# Patient Record
Sex: FEMALE | Race: WHITE | Employment: PART TIME | URBAN - NONMETROPOLITAN AREA
[De-identification: names, ages, dates, MRNs, and addresses within clinical notes are randomized per-mention and may not be internally consistent; named-entity substitution may affect disease eponyms.]

---
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
## 2020-07-17 ENCOUNTER — OFFICE VISIT (OUTPATIENT)
Dept: INTERNAL MEDICINE | Facility: CLINIC | Age: 20
End: 2020-07-17

## 2020-07-17 VITALS
BODY MASS INDEX: 35.55 KG/M2 | WEIGHT: 213.38 LBS | HEIGHT: 65 IN | TEMPERATURE: 98.3 F | OXYGEN SATURATION: 98 % | DIASTOLIC BLOOD PRESSURE: 76 MMHG | SYSTOLIC BLOOD PRESSURE: 115 MMHG | HEART RATE: 85 BPM

## 2020-07-17 DIAGNOSIS — Z30.019 ENCOUNTER FOR FEMALE BIRTH CONTROL: Primary | ICD-10-CM

## 2020-07-17 PROCEDURE — 99202 OFFICE O/P NEW SF 15 MIN: CPT | Performed by: NURSE PRACTITIONER

## 2020-07-17 RX ORDER — NORGESTIMATE AND ETHINYL ESTRADIOL 0.25-0.035
KIT ORAL
COMMUNITY
End: 2020-07-17 | Stop reason: SDUPTHER

## 2020-07-17 RX ORDER — NORGESTIMATE AND ETHINYL ESTRADIOL 0.25-0.035
1 KIT ORAL DAILY
Qty: 28 TABLET | Refills: 6 | Status: SHIPPED | OUTPATIENT
Start: 2020-07-17 | End: 2020-12-17

## 2020-07-17 NOTE — PROGRESS NOTES
Subjective     Chief Complaint   Patient presents with   • Establish Care   • Gynecologic Exam     Pap       History of Present Illness  Pt comes in today to establish care. She is doing well overall. She works as a  worker. Is without complaints today. She started her period yesterday and needs pap exam. Will do at later date.     Review of Systems   Constitutional: Negative for activity change and fever.   HENT: Negative for mouth sores, sinus pain and sneezing.    Eyes: Negative for photophobia.   Respiratory: Negative for cough and shortness of breath.    Cardiovascular: Negative for chest pain and palpitations.   Gastrointestinal: Negative for blood in stool, constipation and nausea.   Endocrine: Negative for polydipsia, polyphagia and polyuria.   Genitourinary: Negative for dyspareunia, hematuria and vaginal discharge.   Musculoskeletal: Positive for back pain ( from lifting kids).   Allergic/Immunologic: Negative for environmental allergies.   Neurological: Negative for seizures, syncope and weakness.   Hematological: Negative for adenopathy. Does not bruise/bleed easily.   Psychiatric/Behavioral: Negative for self-injury, sleep disturbance and suicidal ideas. The patient is not nervous/anxious.       Otherwise complete ROS reviewed and negative except as mentioned in the HPI.    Past Medical History:   Past Medical History:   Diagnosis Date   • Anxiety    • Depression      Past Surgical History:  Past Surgical History:   Procedure Laterality Date   • INNER EAR SURGERY     • TONSILLECTOMY       Social History:  reports that she has been smoking. She has never used smokeless tobacco. She reports that she does not drink alcohol or use drugs.    Family History: family history includes Arthritis in her mother; Cancer in her mother.      Allergies:  No Known Allergies  Medications:  Prior to Admission medications    Medication Sig Start Date End Date Taking? Authorizing Provider  "  norgestimate-ethinyl estradiol (ORTHO-CYCLEN) 0.25-35 MG-MCG per tablet Take  by mouth.    Provider, MD Refugio       Objective     Vital Signs: /76 (BP Location: Right arm, Patient Position: Sitting, Cuff Size: Adult)   Pulse 85   Temp 98.3 °F (36.8 °C) (Skin)   Ht 165.1 cm (65\")   Wt 96.8 kg (213 lb 6 oz)   SpO2 98%   BMI 35.51 kg/m²   Physical Exam   Constitutional: She is oriented to person, place, and time. She appears well-developed and well-nourished.   HENT:   Head: Normocephalic and atraumatic.   Mouth/Throat: Oropharynx is clear and moist.   Eyes: Pupils are equal, round, and reactive to light. Conjunctivae and EOM are normal. No scleral icterus.   Neck: Neck supple. No JVD present.   Cardiovascular: Normal rate, regular rhythm, normal heart sounds and intact distal pulses. Exam reveals no gallop and no friction rub.   No murmur heard.  Pulmonary/Chest: Effort normal and breath sounds normal. She has no wheezes. She has no rales.   Abdominal: Soft. Bowel sounds are normal. She exhibits no distension and no mass. There is no tenderness. There is no rebound and no guarding.   Musculoskeletal: Normal range of motion. She exhibits no edema.   No cyanosis or clubbing   Lymphadenopathy:     She has no cervical adenopathy.   Neurological: She is alert and oriented to person, place, and time. No cranial nerve deficit.   Skin: Skin is warm and dry.   Psychiatric: She has a normal mood and affect. Her behavior is normal.       Patient's Body mass index is 35.51 kg/m². BMI is above normal parameters. Recommendations include: educational material.      Results Reviewed:  Glucose   Date Value Ref Range Status   06/10/2018 98 74 - 109 mg/dL Final     BUN   Date Value Ref Range Status   06/10/2018 7 6 - 20 mg/dL Final     Creatinine   Date Value Ref Range Status   06/10/2018 0.7 0.5 - 0.9 mg/dL Final     Sodium   Date Value Ref Range Status   06/10/2018 139 136 - 145 mmol/L Final     Potassium   Date " Value Ref Range Status   06/10/2018 3.7 3.5 - 5.0 mmol/L Final     Chloride   Date Value Ref Range Status   06/10/2018 101 98 - 111 mmol/L Final     CO2   Date Value Ref Range Status   06/10/2018 24 22 - 29 mmol/L Final     Calcium   Date Value Ref Range Status   06/10/2018 9.5 8.6 - 10.0 mg/dL Final     ALT (SGPT)   Date Value Ref Range Status   06/10/2018 16 5 - 33 U/L Final     AST (SGOT)   Date Value Ref Range Status   06/10/2018 24 5 - 32 U/L Final     WBC   Date Value Ref Range Status   06/10/2018 20.0 (H) 4.8 - 10.8 K/uL Final     Hematocrit   Date Value Ref Range Status   06/10/2018 39.0 37.0 - 47.0 % Final     Platelets   Date Value Ref Range Status   06/10/2018 465 (H) 130 - 400 K/uL Final         Assessment / Plan     Assessment/Plan:  Goldie was seen today for establish care and gynecologic exam.    Diagnoses and all orders for this visit:    Encounter for female birth control    Other orders  -     norgestimate-ethinyl estradiol (ORTHO-CYCLEN) 0.25-35 MG-MCG per tablet; Take 1 tablet by mouth Daily.      No follow-ups on file. unless patient needs to be seen sooner or acute issues arise.    Code Status: Full.     I have discussed the patient results/orders and and plan/recommendation with them at today's visit.      Antonette Florentino, APRN   07/17/2020

## 2020-07-29 ENCOUNTER — OFFICE VISIT (OUTPATIENT)
Dept: INTERNAL MEDICINE | Facility: CLINIC | Age: 20
End: 2020-07-29

## 2020-07-29 VITALS
RESPIRATION RATE: 18 BRPM | SYSTOLIC BLOOD PRESSURE: 106 MMHG | WEIGHT: 209.2 LBS | TEMPERATURE: 98.2 F | DIASTOLIC BLOOD PRESSURE: 68 MMHG | HEART RATE: 89 BPM | OXYGEN SATURATION: 98 % | BODY MASS INDEX: 34.85 KG/M2 | HEIGHT: 65 IN

## 2020-07-29 DIAGNOSIS — Z30.011 ENCOUNTER FOR INITIAL PRESCRIPTION OF CONTRACEPTIVE PILLS: ICD-10-CM

## 2020-07-29 DIAGNOSIS — N89.8 VAGINAL DISCHARGE: ICD-10-CM

## 2020-07-29 DIAGNOSIS — Z00.00 ANNUAL PHYSICAL EXAM: Primary | ICD-10-CM

## 2020-07-29 DIAGNOSIS — Z12.4 SCREENING FOR CERVICAL CANCER: ICD-10-CM

## 2020-07-29 LAB
B-HCG UR QL: NEGATIVE
INTERNAL NEGATIVE CONTROL: NEGATIVE
INTERNAL POSITIVE CONTROL: POSITIVE
Lab: NORMAL

## 2020-07-29 PROCEDURE — 81025 URINE PREGNANCY TEST: CPT | Performed by: NURSE PRACTITIONER

## 2020-07-29 PROCEDURE — 99395 PREV VISIT EST AGE 18-39: CPT | Performed by: NURSE PRACTITIONER

## 2020-07-29 NOTE — PROGRESS NOTES
Subjective     Chief Complaint   Patient presents with   • Annual Exam     Pap smear       History of Present Illness  Pt comes in for her physical with pap exam. She is on BCP and no other meds. States she carries a history of anxiety and depression but decline medication at this time. She is sexually active. .      Review of Systems   Constitutional: Negative for fatigue and fever.   HENT: Negative for sinus pressure, sneezing and sore throat.    Eyes: Negative for itching and visual disturbance.   Respiratory: Negative for cough, shortness of breath and wheezing.    Cardiovascular: Negative for chest pain and palpitations.   Gastrointestinal: Negative for constipation and diarrhea.   Endocrine: Negative for polydipsia, polyphagia and polyuria.   Genitourinary: Negative for dyspareunia, dysuria, hematuria and menstrual problem.   Musculoskeletal: Positive for back pain.   Skin: Negative for pallor and rash.   Allergic/Immunologic: Negative for environmental allergies and immunocompromised state.   Neurological: Negative for seizures, syncope and headaches.   Hematological: Negative for adenopathy. Does not bruise/bleed easily.   Psychiatric/Behavioral: Negative for suicidal ideas. The patient is nervous/anxious. The patient is not hyperactive.         Depression as well.       Otherwise complete ROS reviewed and negative except as mentioned in the HPI.    Past Medical History:   Past Medical History:   Diagnosis Date   • Anxiety    • Depression      Past Surgical History:  Past Surgical History:   Procedure Laterality Date   • INNER EAR SURGERY     • TONSILLECTOMY       Social History:  reports that she has been smoking cigarettes. She has a 0.50 pack-year smoking history. She has never used smokeless tobacco. She reports that she does not drink alcohol or use drugs.    Family History: family history includes Arthritis in her mother; Cancer in her mother.      Allergies:  No Known  "Allergies  Medications:  Prior to Admission medications    Medication Sig Start Date End Date Taking? Authorizing Provider   norgestimate-ethinyl estradiol (ORTHO-CYCLEN) 0.25-35 MG-MCG per tablet Take 1 tablet by mouth Daily. 7/17/20  Yes Antonette Florentino APRN       Objective     Vital Signs: /68 (BP Location: Left arm, Patient Position: Sitting, Cuff Size: Large Adult)   Pulse 89   Temp 98.2 °F (36.8 °C) (Skin)   Resp 18   Ht 165.1 cm (65\")   Wt 94.9 kg (209 lb 3.2 oz)   SpO2 98%   BMI 34.81 kg/m²   Physical Exam   Constitutional: She is oriented to person, place, and time. She appears well-developed and well-nourished.   HENT:   Head: Normocephalic and atraumatic.   Mouth/Throat: Oropharynx is clear and moist.   Eyes: Pupils are equal, round, and reactive to light. Conjunctivae and EOM are normal. No scleral icterus.   Neck: Neck supple. No JVD present.   Cardiovascular: Normal rate, regular rhythm, normal heart sounds and intact distal pulses. Exam reveals no gallop and no friction rub.   No murmur heard.  Pulmonary/Chest: Effort normal and breath sounds normal. She has no wheezes. She has no rales.   Cystic type changes bilateral breasts.    Abdominal: Soft. Bowel sounds are normal. She exhibits no distension and no mass. There is no tenderness. There is no rebound and no guarding.   Genitourinary: Uterus normal. Pelvic exam was performed with patient supine. Cervix exhibits discharge and friability. Right adnexum displays no mass. Left adnexum displays no mass. Vaginal discharge found.   Musculoskeletal: Normal range of motion. She exhibits no edema.   No cyanosis or clubbing   Lymphadenopathy:     She has no cervical adenopathy.   Neurological: She is alert and oriented to person, place, and time. No cranial nerve deficit.   Skin: Skin is warm and dry.   Psychiatric: She has a normal mood and affect. Her behavior is normal.       Patient's Body mass index is 34.81 kg/m². BMI is above " normal parameters. Recommendations include: educational material.      Results Reviewed:  Glucose   Date Value Ref Range Status   06/10/2018 98 74 - 109 mg/dL Final     BUN   Date Value Ref Range Status   06/10/2018 7 6 - 20 mg/dL Final     Creatinine   Date Value Ref Range Status   06/10/2018 0.7 0.5 - 0.9 mg/dL Final     Sodium   Date Value Ref Range Status   06/10/2018 139 136 - 145 mmol/L Final     Potassium   Date Value Ref Range Status   06/10/2018 3.7 3.5 - 5.0 mmol/L Final     Chloride   Date Value Ref Range Status   06/10/2018 101 98 - 111 mmol/L Final     CO2   Date Value Ref Range Status   06/10/2018 24 22 - 29 mmol/L Final     Calcium   Date Value Ref Range Status   06/10/2018 9.5 8.6 - 10.0 mg/dL Final     ALT (SGPT)   Date Value Ref Range Status   06/10/2018 16 5 - 33 U/L Final     AST (SGOT)   Date Value Ref Range Status   06/10/2018 24 5 - 32 U/L Final     WBC   Date Value Ref Range Status   06/10/2018 20.0 (H) 4.8 - 10.8 K/uL Final     Hematocrit   Date Value Ref Range Status   06/10/2018 39.0 37.0 - 47.0 % Final     Platelets   Date Value Ref Range Status   06/10/2018 465 (H) 130 - 400 K/uL Final         Assessment / Plan     Assessment/Plan:  Goldie was seen today for annual exam.    Diagnoses and all orders for this visit:    Annual physical exam  -     CBC & Differential  -     Comprehensive metabolic panel  -     T4  -     TSH  -     Lipid panel  -     Cancel: Liquid-based Pap Smear, Screening; Future  -     Liquid-based Pap Smear, Screening  -     POCT pregnancy, urine    Vaginal discharge  -     Cancel: NuSwab VG+ - Swab, Vagina; Future  -     NuSwab VG+ - Swab, Vagina    Screening for cervical cancer  -     Cancel: Liquid-based Pap Smear, Screening; Future  -     Liquid-based Pap Smear, Screening    Encounter for initial prescription of contraceptive pills      Pregnancy test was negative.     No follow-ups on file. unless patient needs to be seen sooner or acute issues arise.    Code  Status: Full.     I have discussed the patient results/orders and and plan/recommendation with them at today's visit.      Antonette Florentino, APRN   07/29/2020

## 2020-07-30 LAB
ALBUMIN SERPL-MCNC: 4.1 G/DL (ref 3.9–5)
ALBUMIN/GLOB SERPL: 1.3 {RATIO} (ref 1.2–2.2)
ALP SERPL-CCNC: 77 IU/L (ref 39–117)
ALT SERPL-CCNC: 14 IU/L (ref 0–32)
AST SERPL-CCNC: 14 IU/L (ref 0–40)
BASOPHILS # BLD AUTO: 0 X10E3/UL (ref 0–0.2)
BASOPHILS NFR BLD AUTO: 0 %
BILIRUB SERPL-MCNC: 0.2 MG/DL (ref 0–1.2)
BUN SERPL-MCNC: 9 MG/DL (ref 6–20)
BUN/CREAT SERPL: 13 (ref 9–23)
CALCIUM SERPL-MCNC: 9 MG/DL (ref 8.7–10.2)
CHLORIDE SERPL-SCNC: 109 MMOL/L (ref 96–106)
CHOLEST SERPL-MCNC: 204 MG/DL (ref 100–199)
CO2 SERPL-SCNC: 21 MMOL/L (ref 20–29)
CREAT SERPL-MCNC: 0.72 MG/DL (ref 0.57–1)
EOSINOPHIL # BLD AUTO: 0.3 X10E3/UL (ref 0–0.4)
EOSINOPHIL NFR BLD AUTO: 3 %
ERYTHROCYTE [DISTWIDTH] IN BLOOD BY AUTOMATED COUNT: 14.6 % (ref 11.7–15.4)
GLOBULIN SER CALC-MCNC: 3.1 G/DL (ref 1.5–4.5)
GLUCOSE SERPL-MCNC: 82 MG/DL (ref 65–99)
HCT VFR BLD AUTO: 35.9 % (ref 34–46.6)
HDLC SERPL-MCNC: 48 MG/DL
HGB BLD-MCNC: 11.4 G/DL (ref 11.1–15.9)
IMM GRANULOCYTES # BLD AUTO: 0.1 X10E3/UL (ref 0–0.1)
IMM GRANULOCYTES NFR BLD AUTO: 1 %
LDLC SERPL CALC-MCNC: 129 MG/DL (ref 0–99)
LYMPHOCYTES # BLD AUTO: 4.4 X10E3/UL (ref 0.7–3.1)
LYMPHOCYTES NFR BLD AUTO: 39 %
MCH RBC QN AUTO: 26.2 PG (ref 26.6–33)
MCHC RBC AUTO-ENTMCNC: 31.8 G/DL (ref 31.5–35.7)
MCV RBC AUTO: 83 FL (ref 79–97)
MONOCYTES # BLD AUTO: 0.5 X10E3/UL (ref 0.1–0.9)
MONOCYTES NFR BLD AUTO: 4 %
NEUTROPHILS # BLD AUTO: 6 X10E3/UL (ref 1.4–7)
NEUTROPHILS NFR BLD AUTO: 53 %
PLATELET # BLD AUTO: 398 X10E3/UL (ref 150–450)
POTASSIUM SERPL-SCNC: 4.2 MMOL/L (ref 3.5–5.2)
PROT SERPL-MCNC: 7.2 G/DL (ref 6–8.5)
RBC # BLD AUTO: 4.35 X10E6/UL (ref 3.77–5.28)
SODIUM SERPL-SCNC: 141 MMOL/L (ref 134–144)
T4 SERPL-MCNC: 9.6 UG/DL (ref 4.5–12)
TRIGL SERPL-MCNC: 135 MG/DL (ref 0–149)
TSH SERPL DL<=0.005 MIU/L-ACNC: 1.23 UIU/ML (ref 0.45–4.5)
VLDLC SERPL CALC-MCNC: 27 MG/DL (ref 5–40)
WBC # BLD AUTO: 11.3 X10E3/UL (ref 3.4–10.8)

## 2020-08-04 LAB
CYTOLOGIST CVX/VAG CYTO: ABNORMAL
CYTOLOGY CVX/VAG DOC CYTO: ABNORMAL
DX ICD CODE: ABNORMAL
DX ICD CODE: ABNORMAL
HIV 1 & 2 AB SER-IMP: ABNORMAL
OTHER STN SPEC: ABNORMAL
PATHOLOGIST CVX/VAG CYTO: ABNORMAL
RECOM F/U CVX/VAG CYTO: ABNORMAL
STAT OF ADQ CVX/VAG CYTO-IMP: ABNORMAL

## 2020-08-06 ENCOUNTER — TELEPHONE (OUTPATIENT)
Dept: INTERNAL MEDICINE | Facility: CLINIC | Age: 20
End: 2020-08-06

## 2020-08-06 DIAGNOSIS — R87.612 LOW GRADE SQUAMOUS INTRAEPITHELIAL LESION ON CYTOLOGIC SMEAR OF CERVIX (LGSIL): Primary | ICD-10-CM

## 2020-08-06 RX ORDER — FLUCONAZOLE 200 MG/1
200 TABLET ORAL DAILY
Qty: 5 TABLET | Refills: 0 | Status: SHIPPED | OUTPATIENT
Start: 2020-08-06 | End: 2021-08-23

## 2020-08-06 NOTE — TELEPHONE ENCOUNTER
Patient called yesterday and a RX was supposed to be called in for a yeast infection.    Please Advise.

## 2020-08-11 LAB
A VAGINAE DNA VAG QL NAA+PROBE: NORMAL SCORE
BVAB2 DNA VAG QL NAA+PROBE: NORMAL SCORE
C ALBICANS DNA VAG QL NAA+PROBE: NEGATIVE
C GLABRATA DNA VAG QL NAA+PROBE: NEGATIVE
C TRACH DNA VAG QL NAA+PROBE: NORMAL
MEGA1 DNA VAG QL NAA+PROBE: NORMAL SCORE
N GONORRHOEA DNA VAG QL NAA+PROBE: NORMAL
T VAGINALIS DNA VAG QL NAA+PROBE: NEGATIVE

## 2020-09-01 ENCOUNTER — OFFICE VISIT (OUTPATIENT)
Dept: OBSTETRICS AND GYNECOLOGY | Facility: CLINIC | Age: 20
End: 2020-09-01

## 2020-09-01 VITALS — HEIGHT: 65 IN | WEIGHT: 211 LBS | BODY MASS INDEX: 35.16 KG/M2

## 2020-09-01 DIAGNOSIS — R87.612 LGSIL ON PAP SMEAR OF CERVIX: Primary | ICD-10-CM

## 2020-09-01 PROCEDURE — 99203 OFFICE O/P NEW LOW 30 MIN: CPT | Performed by: OBSTETRICS & GYNECOLOGY

## 2020-09-01 NOTE — PROGRESS NOTES
Subjective   Goldie Thomas is a 19 y.o. female  YOB: 2000        Chief Complaint   Patient presents with   • Abnormal Pap Smear     PT is here for an abnormal pap PT states she was treated for a yeast infection  PT states she is not having any itching or any symptoms at this time.         19-year-old female  0 para 0 last menstrual period 2020 presents to [facility].  Patient was previously referred due to low-grade Pap smear.  Patient has no complaints at this time.  She does report she is currently on Ortho-Cyclen for cycle regulation.  She reports her cycles are overall regular lasting approximately 4 to 5 days.  She reports that she is sexually active with one partner at this time using condoms.  She does also report that she currently smokes less than a pack per day.      No Known Allergies    Past Medical History:   Diagnosis Date   • Anxiety    • Depression        Family History   Problem Relation Age of Onset   • Arthritis Mother    • Cancer Mother        Social History     Socioeconomic History   • Marital status: Single     Spouse name: Not on file   • Number of children: Not on file   • Years of education: Not on file   • Highest education level: Not on file   Tobacco Use   • Smoking status: Current Every Day Smoker     Packs/day: 0.50     Years: 1.00     Pack years: 0.50     Types: Cigarettes   • Smokeless tobacco: Never Used   Substance and Sexual Activity   • Alcohol use: Never     Frequency: Never   • Drug use: Never   • Sexual activity: Yes     Partners: Male     Birth control/protection: OCP         Current Outpatient Medications:   •  norgestimate-ethinyl estradiol (ORTHO-CYCLEN) 0.25-35 MG-MCG per tablet, Take 1 tablet by mouth Daily., Disp: 28 tablet, Rfl: 6  •  fluconazole (Diflucan) 200 MG tablet, Take 1 tablet by mouth Daily., Disp: 5 tablet, Rfl: 0    Patient's last menstrual period was 08/15/2020 (approximate).    Sexual History:         Could not be  "calculated    Past Surgical History:   Procedure Laterality Date   • INNER EAR SURGERY     • TONSILLECTOMY         Review of Systems   Constitutional: Negative for activity change and unexpected weight loss.   HENT: Negative for congestion.    Cardiovascular: Negative for chest pain.   Gastrointestinal: Negative for blood in stool, constipation and diarrhea.   Endocrine: Negative for cold intolerance and heat intolerance.   Genitourinary: Negative for dyspareunia, pelvic pain and vaginal discharge.   Musculoskeletal: Negative for arthralgias, back pain, neck pain and neck stiffness.   Skin: Negative for rash.   Neurological: Negative for dizziness and headache.   Psychiatric/Behavioral: Negative for sleep disturbance. The patient is not nervous/anxious.        Objective   Physical Exam   Constitutional: She is oriented to person, place, and time. She appears well-developed and well-nourished. No distress.   HENT:   Head: Normocephalic and atraumatic.   Eyes: Conjunctivae are normal. Right eye exhibits no discharge. Left eye exhibits no discharge.   Neck: Normal range of motion. No thyromegaly present.   Cardiovascular: Normal rate and regular rhythm. Exam reveals no gallop and no friction rub.   No murmur heard.  Pulmonary/Chest: Effort normal and breath sounds normal. No stridor. She has no wheezes. She has no rales.   Abdominal: There is no tenderness.   Musculoskeletal: Normal range of motion.   Neurological: She is alert and oriented to person, place, and time.   Skin: Skin is warm and dry.   Psychiatric: She has a normal mood and affect. Her behavior is normal. Judgment normal.   Nursing note and vitals reviewed.        Vitals:    09/01/20 1310   Weight: 95.7 kg (211 lb)   Height: 165.1 cm (65\")       Goldie was seen today for abnormal pap smear.    Diagnoses and all orders for this visit:    LGSIL on Pap smear of cervix    -Discussed with patient ASC CP recommendations at this time.  Discussed with patient " that as per ASC CP Pap smears should begin at age 21.  -Discussed with patient Gardasil at this time.  Patient declined.  Offered to give patient information.  Patient declined.  -Discussed with patient importance of smoking cessation at this time.  -Questions answered patient verbalized understanding of plan.    Kathya Garcia, DO

## 2021-08-17 NOTE — TELEPHONE ENCOUNTER
Rx Refill Note  Requested Prescriptions     Pending Prescriptions Disp Refills   • Sprintec 28 0.25-35 MG-MCG per tablet [Pharmacy Med Name: SPRINTEC 28 DAY TABLET] 84 tablet 2     Sig: TAKE 1 TABLET BY MOUTH EVERY DAY     Med last filled: 12/17/20    Last office visit with prescribing clinician: 7/29/2020      Next office visit with prescribing clinician: Visit date not found- Want apt since its been over a year?              Kareen Hills RN  08/17/21, 07:35 CDT

## 2021-08-23 ENCOUNTER — OFFICE VISIT (OUTPATIENT)
Dept: INTERNAL MEDICINE | Facility: CLINIC | Age: 21
End: 2021-08-23

## 2021-08-23 VITALS
WEIGHT: 201.5 LBS | SYSTOLIC BLOOD PRESSURE: 109 MMHG | HEART RATE: 104 BPM | BODY MASS INDEX: 33.57 KG/M2 | TEMPERATURE: 98.4 F | OXYGEN SATURATION: 98 % | DIASTOLIC BLOOD PRESSURE: 65 MMHG | HEIGHT: 65 IN

## 2021-08-23 DIAGNOSIS — Z30.019 ENCOUNTER FOR FEMALE BIRTH CONTROL: Primary | ICD-10-CM

## 2021-08-23 DIAGNOSIS — Z00.00 ENCOUNTER FOR WELLNESS EXAMINATION IN ADULT: ICD-10-CM

## 2021-08-23 PROCEDURE — 99395 PREV VISIT EST AGE 18-39: CPT | Performed by: NURSE PRACTITIONER

## 2021-08-23 RX ORDER — NORGESTIMATE AND ETHINYL ESTRADIOL 0.25-0.035
1 KIT ORAL DAILY
Qty: 28 TABLET | Refills: 11 | Status: SHIPPED | OUTPATIENT
Start: 2021-08-23 | End: 2022-09-09

## 2021-08-23 NOTE — PROGRESS NOTES
Subjective     Chief Complaint   Patient presents with   • Annual Exam       History of Present Illness  Patient states she is here for her annual physical. The patient stats that she takes sprintec daily.She saw her OB/GYN last September.  She states she smokes 1/2 a pack of cigerettes daily.  The patient denies any shortness of breath of chest pain. She has no new complaints at this time.     Review of Systems   Constitutional: Negative for chills, fatigue and fever.   HENT: Negative for congestion and sinus pressure.    Respiratory: Negative for cough, chest tightness and shortness of breath.    Cardiovascular: Negative for chest pain, palpitations and leg swelling.   Gastrointestinal: Negative for constipation, diarrhea and nausea.   Endocrine: Negative for cold intolerance and heat intolerance.   Genitourinary: Negative for difficulty urinating, menstrual problem and pelvic pain.   Musculoskeletal: Negative for arthralgias, back pain and myalgias.   Neurological: Negative for dizziness, weakness, light-headedness and headaches.   Hematological: Does not bruise/bleed easily.       Past Medical History:   Past Medical History:   Diagnosis Date   • Anxiety    • Depression      Past Surgical History:  Past Surgical History:   Procedure Laterality Date   • INNER EAR SURGERY     • TONSILLECTOMY       Social History:  reports that she has been smoking cigarettes. She has a 0.50 pack-year smoking history. She has never used smokeless tobacco. She reports that she does not drink alcohol and does not use drugs.    Family History: family history includes Arthritis in her mother; Cancer in her mother.       Allergies:  No Known Allergies  Medications:  Prior to Admission medications    Medication Sig Start Date End Date Taking? Authorizing Provider   Sprintec 28 0.25-35 MG-MCG per tablet TAKE 1 TABLET BY MOUTH EVERY DAY 8/17/21  Yes Antonette Florentino APRN   fluconazole (Diflucan) 200 MG tablet Take 1 tablet  "by mouth Daily. 8/6/20   Mishel Antonette EsparzaSERA alclaa       Objective     Vital Signs: /65 (BP Location: Left arm, Patient Position: Sitting, Cuff Size: Adult)   Pulse 104   Temp 98.4 °F (36.9 °C) (Skin)   Ht 165.1 cm (65\")   Wt 91.4 kg (201 lb 8 oz)   SpO2 98%   BMI 33.53 kg/m²   Physical Exam  Vitals reviewed.   Constitutional:       Appearance: She is well-developed.   HENT:      Head: Normocephalic and atraumatic.   Eyes:      Pupils: Pupils are equal, round, and reactive to light.   Neck:      Vascular: No JVD.   Cardiovascular:      Rate and Rhythm: Normal rate and regular rhythm.      Pulses: Normal pulses.      Heart sounds: Normal heart sounds.   Pulmonary:      Effort: Pulmonary effort is normal.      Breath sounds: Normal breath sounds.   Abdominal:      General: Bowel sounds are normal.      Palpations: Abdomen is soft.   Musculoskeletal:         General: No deformity. Normal range of motion.      Cervical back: Normal range of motion and neck supple.   Lymphadenopathy:      Cervical: No cervical adenopathy.   Skin:     General: Skin is warm and dry.   Neurological:      Mental Status: She is alert and oriented to person, place, and time.   Psychiatric:         Behavior: Behavior normal.         Thought Content: Thought content normal.         Judgment: Judgment normal.       Patient's Body mass index is 33.53 kg/m². indicating that she is obese (BMI >30). Obesity-related health conditions include the following: none. Obesity is improving with lifestyle modifications. BMI is is above average; BMI management plan is completed. We discussed low calorie, low carb based diet program, portion control and increasing exercise..      Results Reviewed:  Glucose   Date Value Ref Range Status   06/10/2018 98 74 - 109 mg/dL Final     BUN   Date Value Ref Range Status   07/29/2020 9 6 - 20 mg/dL Final   06/10/2018 7 6 - 20 mg/dL Final     Creatinine   Date Value Ref Range Status   07/29/2020 0.72 0.57 - " 1.00 mg/dL Final   06/10/2018 0.7 0.5 - 0.9 mg/dL Final     Sodium   Date Value Ref Range Status   07/29/2020 141 134 - 144 mmol/L Final   06/10/2018 139 136 - 145 mmol/L Final     Potassium   Date Value Ref Range Status   07/29/2020 4.2 3.5 - 5.2 mmol/L Final   06/10/2018 3.7 3.5 - 5.0 mmol/L Final     Chloride   Date Value Ref Range Status   07/29/2020 109 (H) 96 - 106 mmol/L Final   06/10/2018 101 98 - 111 mmol/L Final     CO2   Date Value Ref Range Status   06/10/2018 24 22 - 29 mmol/L Final     Total CO2   Date Value Ref Range Status   07/29/2020 21 20 - 29 mmol/L Final     Calcium   Date Value Ref Range Status   07/29/2020 9.0 8.7 - 10.2 mg/dL Final   06/10/2018 9.5 8.6 - 10.0 mg/dL Final     ALT (SGPT)   Date Value Ref Range Status   07/29/2020 14 0 - 32 IU/L Final   06/10/2018 16 5 - 33 U/L Final     AST (SGOT)   Date Value Ref Range Status   07/29/2020 14 0 - 40 IU/L Final   06/10/2018 24 5 - 32 U/L Final     WBC   Date Value Ref Range Status   07/29/2020 11.3 (H) 3.4 - 10.8 x10E3/uL Final   06/10/2018 20.0 (H) 4.8 - 10.8 K/uL Final     Hematocrit   Date Value Ref Range Status   07/29/2020 35.9 34.0 - 46.6 % Final   06/10/2018 39.0 37.0 - 47.0 % Final     Platelets   Date Value Ref Range Status   07/29/2020 398 150 - 450 x10E3/uL Final   06/10/2018 465 (H) 130 - 400 K/uL Final     Triglycerides   Date Value Ref Range Status   07/29/2020 135 0 - 149 mg/dL Final     HDL Cholesterol   Date Value Ref Range Status   07/29/2020 48 >39 mg/dL Final     LDL Cholesterol    Date Value Ref Range Status   07/29/2020 129 (H) 0 - 99 mg/dL Final         Assessment / Plan     Assessment/Plan:  Diagnoses and all orders for this visit:     1. Encounter for wellness examination in adult  -     CBC w AUTO Differential  -     Comprehensive metabolic panel  -     Lipid Panel  -     T4  -     TSH     2. Encounter for female birth control (Primary)  -     norgestimate-ethinyl estradiol (Sprintec 28) 0.25-35 MG-MCG per tablet; Take  1 tablet by mouth Daily.  Dispense: 28 tablet; Refill: 11   -  Safe sex practices discusses with patient. The patient is due for her annual pap smear in September. She states she will make an appointment with her OB Gyn.  Smoking cessation information discussed with the patient.  Patient informed that she is at increased risk of blood clots for smoking while taking Sprintec.       Return in about 1 year (around 8/23/2022). unless patient needs to be seen sooner or acute issues arise.      I have discussed the patient results/orders and and plan/recommendation with them at today's visit.      Antonette Florentino, APRN   08/23/2021

## 2021-08-24 LAB
ALBUMIN SERPL-MCNC: 4 G/DL (ref 3.9–5)
ALBUMIN/GLOB SERPL: 1.4 {RATIO} (ref 1.2–2.2)
ALP SERPL-CCNC: 83 IU/L (ref 48–121)
ALT SERPL-CCNC: 10 IU/L (ref 0–32)
AST SERPL-CCNC: 11 IU/L (ref 0–40)
BASOPHILS # BLD AUTO: 0.1 X10E3/UL (ref 0–0.2)
BASOPHILS NFR BLD AUTO: 1 %
BILIRUB SERPL-MCNC: <0.2 MG/DL (ref 0–1.2)
BUN SERPL-MCNC: 8 MG/DL (ref 6–20)
BUN/CREAT SERPL: 12 (ref 9–23)
CALCIUM SERPL-MCNC: 9 MG/DL (ref 8.7–10.2)
CHLORIDE SERPL-SCNC: 107 MMOL/L (ref 96–106)
CHOLEST SERPL-MCNC: 181 MG/DL (ref 100–199)
CO2 SERPL-SCNC: 20 MMOL/L (ref 20–29)
CREAT SERPL-MCNC: 0.68 MG/DL (ref 0.57–1)
EOSINOPHIL # BLD AUTO: 0.4 X10E3/UL (ref 0–0.4)
EOSINOPHIL NFR BLD AUTO: 5 %
ERYTHROCYTE [DISTWIDTH] IN BLOOD BY AUTOMATED COUNT: 14.2 % (ref 11.7–15.4)
GLOBULIN SER CALC-MCNC: 2.8 G/DL (ref 1.5–4.5)
GLUCOSE SERPL-MCNC: 83 MG/DL (ref 65–99)
HCT VFR BLD AUTO: 37.8 % (ref 34–46.6)
HDLC SERPL-MCNC: 44 MG/DL
HGB BLD-MCNC: 12.4 G/DL (ref 11.1–15.9)
IMM GRANULOCYTES # BLD AUTO: 0 X10E3/UL (ref 0–0.1)
IMM GRANULOCYTES NFR BLD AUTO: 0 %
LDLC SERPL CALC-MCNC: 109 MG/DL (ref 0–99)
LYMPHOCYTES # BLD AUTO: 4.5 X10E3/UL (ref 0.7–3.1)
LYMPHOCYTES NFR BLD AUTO: 47 %
MCH RBC QN AUTO: 28.1 PG (ref 26.6–33)
MCHC RBC AUTO-ENTMCNC: 32.8 G/DL (ref 31.5–35.7)
MCV RBC AUTO: 86 FL (ref 79–97)
MONOCYTES # BLD AUTO: 0.4 X10E3/UL (ref 0.1–0.9)
MONOCYTES NFR BLD AUTO: 4 %
NEUTROPHILS # BLD AUTO: 4.1 X10E3/UL (ref 1.4–7)
NEUTROPHILS NFR BLD AUTO: 43 %
PLATELET # BLD AUTO: 364 X10E3/UL (ref 150–450)
POTASSIUM SERPL-SCNC: 4.6 MMOL/L (ref 3.5–5.2)
PROT SERPL-MCNC: 6.8 G/DL (ref 6–8.5)
RBC # BLD AUTO: 4.42 X10E6/UL (ref 3.77–5.28)
SODIUM SERPL-SCNC: 140 MMOL/L (ref 134–144)
T4 SERPL-MCNC: 6.9 UG/DL (ref 4.5–12)
TRIGL SERPL-MCNC: 161 MG/DL (ref 0–149)
TSH SERPL DL<=0.005 MIU/L-ACNC: 1.53 UIU/ML (ref 0.45–4.5)
VLDLC SERPL CALC-MCNC: 28 MG/DL (ref 5–40)
WBC # BLD AUTO: 9.5 X10E3/UL (ref 3.4–10.8)

## 2022-01-18 ENCOUNTER — OFFICE VISIT (OUTPATIENT)
Dept: INTERNAL MEDICINE | Facility: CLINIC | Age: 22
End: 2022-01-18

## 2022-01-18 VITALS
DIASTOLIC BLOOD PRESSURE: 74 MMHG | BODY MASS INDEX: 36.06 KG/M2 | HEIGHT: 65 IN | OXYGEN SATURATION: 98 % | HEART RATE: 87 BPM | SYSTOLIC BLOOD PRESSURE: 118 MMHG | WEIGHT: 216.4 LBS | RESPIRATION RATE: 17 BRPM | TEMPERATURE: 97.5 F

## 2022-01-18 DIAGNOSIS — F41.1 GENERALIZED ANXIETY DISORDER: Primary | ICD-10-CM

## 2022-01-18 DIAGNOSIS — F34.1 DYSTHYMIA: ICD-10-CM

## 2022-01-18 PROCEDURE — 99213 OFFICE O/P EST LOW 20 MIN: CPT | Performed by: NURSE PRACTITIONER

## 2022-01-18 RX ORDER — HYDROXYZINE HYDROCHLORIDE 10 MG/1
10 TABLET, FILM COATED ORAL 3 TIMES DAILY PRN
Qty: 90 TABLET | Refills: 0 | Status: SHIPPED | OUTPATIENT
Start: 2022-01-18 | End: 2022-04-08

## 2022-01-18 RX ORDER — FLUOXETINE HYDROCHLORIDE 20 MG/1
20 CAPSULE ORAL DAILY
Qty: 30 CAPSULE | Refills: 1 | Status: SHIPPED | OUTPATIENT
Start: 2022-01-18

## 2022-01-18 NOTE — PROGRESS NOTES
Subjective     Chief Complaint   Patient presents with   • Anxiety     Previous history. Not taking medication.    • Depression       History of Present Illness  Pt comes in with complaints of Anxiety and depression. States she has been on Celexa, Buspar and they made her feel like a zombie and she wanted to sleep. Cannot recall any additional medications. Tends to over think things. States she feels like she needs a lift.     PHQ-9 Depression Screening  Little interest or pleasure in doing things? 2   Feeling down, depressed, or hopeless? 2   Trouble falling or staying asleep, or sleeping too much? 0   Feeling tired or having little energy? 2   Poor appetite or overeating? 0   Feeling bad about yourself - or that you are a failure or have let yourself or your family down? 2   Trouble concentrating on things, such as reading the newspaper or watching television? 2   Moving or speaking so slowly that other people could have noticed? Or the opposite - being so fidgety or restless that you have been moving around a lot more than usual? 0   Thoughts that you would be better off dead, or of hurting yourself in some way? 0   PHQ-9 Total Score 10   If you checked off any problems, how difficult have these problems made it for you to do your work, take care of things at home, or get along with other people? Very difficult     Over the last two weeks, how often have you been bothered by the following problems?  Feeling nervous, anxious or on edge: Several days  Not being able to stop or control worrying: Not at all  Worrying too much about different things: Several days  Trouble Relaxing: More than half the days  Being so restless that it is hard to sit still: Not at all  Becoming easily annoyed or irritable: More than half the days  Feeling afraid as if something awful might happen: Not at all  ENMANUEL 7 Total Score: 6  If you checked any problems, how difficult have these problems made it for you to do your work, take  "care of things at home, or get along with other people: Very difficult      Review of Systems   Otherwise complete ROS reviewed and negative except as mentioned in the HPI.    Past Medical History:   Past Medical History:   Diagnosis Date   • Anxiety    • Depression      Past Surgical History:  Past Surgical History:   Procedure Laterality Date   • INNER EAR SURGERY     • TONSILLECTOMY       Social History:  reports that she has been smoking cigarettes. She has a 0.50 pack-year smoking history. She has never used smokeless tobacco. She reports that she does not drink alcohol and does not use drugs.    Family History: family history includes Arthritis in her mother; Cancer in her mother.       Allergies:  No Known Allergies  Medications:  Prior to Admission medications    Medication Sig Start Date End Date Taking? Authorizing Provider   norgestimate-ethinyl estradiol (Sprintec 28) 0.25-35 MG-MCG per tablet Take 1 tablet by mouth Daily. 8/23/21  Yes Antonette Florentino APRN       Objective     Vital Signs: /74 (BP Location: Right arm, Patient Position: Sitting, Cuff Size: Adult)   Pulse 87   Temp 97.5 °F (36.4 °C) (Skin)   Resp 17   Ht 165.1 cm (65\")   Wt 98.2 kg (216 lb 6.4 oz)   SpO2 98%   BMI 36.01 kg/m²   Physical Exam  Vitals reviewed.   Constitutional:       Appearance: She is well-developed.   HENT:      Head: Normocephalic and atraumatic.      Mouth/Throat:      Mouth: Mucous membranes are moist.   Eyes:      Pupils: Pupils are equal, round, and reactive to light.   Neck:      Vascular: No JVD.   Cardiovascular:      Rate and Rhythm: Normal rate and regular rhythm.   Pulmonary:      Effort: Pulmonary effort is normal.   Abdominal:      General: Bowel sounds are normal.      Palpations: Abdomen is soft.   Musculoskeletal:         General: No deformity.      Cervical back: Normal range of motion and neck supple.   Lymphadenopathy:      Cervical: No cervical adenopathy.   Skin:     General: Skin " is warm and dry.   Neurological:      Mental Status: She is alert and oriented to person, place, and time.      Comments: Fidgety but flat affect. No flight of ideas.    Psychiatric:         Behavior: Behavior normal.         Thought Content: Thought content normal.         Judgment: Judgment normal.        Results Reviewed:  Glucose   Date Value Ref Range Status   08/23/2021 83 65 - 99 mg/dL Final   06/10/2018 98 74 - 109 mg/dL Final     BUN   Date Value Ref Range Status   08/23/2021 8 6 - 20 mg/dL Final   06/10/2018 7 6 - 20 mg/dL Final     Creatinine   Date Value Ref Range Status   08/23/2021 0.68 0.57 - 1.00 mg/dL Final   06/10/2018 0.7 0.5 - 0.9 mg/dL Final     Sodium   Date Value Ref Range Status   08/23/2021 140 134 - 144 mmol/L Final   06/10/2018 139 136 - 145 mmol/L Final     Potassium   Date Value Ref Range Status   08/23/2021 4.6 3.5 - 5.2 mmol/L Final   06/10/2018 3.7 3.5 - 5.0 mmol/L Final     Chloride   Date Value Ref Range Status   08/23/2021 107 (H) 96 - 106 mmol/L Final   06/10/2018 101 98 - 111 mmol/L Final     CO2   Date Value Ref Range Status   06/10/2018 24 22 - 29 mmol/L Final     Total CO2   Date Value Ref Range Status   08/23/2021 20 20 - 29 mmol/L Final     Calcium   Date Value Ref Range Status   08/23/2021 9.0 8.7 - 10.2 mg/dL Final   06/10/2018 9.5 8.6 - 10.0 mg/dL Final     ALT (SGPT)   Date Value Ref Range Status   08/23/2021 10 0 - 32 IU/L Final   06/10/2018 16 5 - 33 U/L Final     AST (SGOT)   Date Value Ref Range Status   08/23/2021 11 0 - 40 IU/L Final   06/10/2018 24 5 - 32 U/L Final     WBC   Date Value Ref Range Status   08/23/2021 9.5 3.4 - 10.8 x10E3/uL Final   06/10/2018 20.0 (H) 4.8 - 10.8 K/uL Final     Hematocrit   Date Value Ref Range Status   08/23/2021 37.8 34.0 - 46.6 % Final   06/10/2018 39.0 37.0 - 47.0 % Final     Platelets   Date Value Ref Range Status   08/23/2021 364 150 - 450 x10E3/uL Final   06/10/2018 465 (H) 130 - 400 K/uL Final     Triglycerides   Date Value  Ref Range Status   08/23/2021 161 (H) 0 - 149 mg/dL Final     HDL Cholesterol   Date Value Ref Range Status   08/23/2021 44 >39 mg/dL Final     LDL Chol Calc (NIH)   Date Value Ref Range Status   08/23/2021 109 (H) 0 - 99 mg/dL Final         Assessment / Plan     Assessment/Plan:  Diagnoses and all orders for this visit:    1. Generalized anxiety disorder (Primary)  -     FLUoxetine (PROzac) 20 MG capsule; Take 1 capsule by mouth Daily.  Dispense: 30 capsule; Refill: 1  -     hydrOXYzine (ATARAX) 10 MG tablet; Take 1 tablet by mouth 3 (Three) Times a Day As Needed for Itching.  Dispense: 90 tablet; Refill: 0    2. Dysthymia  -     FLUoxetine (PROzac) 20 MG capsule; Take 1 capsule by mouth Daily.  Dispense: 30 capsule; Refill: 1      Return in about 1 month (around 2/18/2022). unless patient needs to be seen sooner or acute issues arise.    Code Status: Full.     I have discussed the patient results/orders and and plan/recommendation with them at today's visit.      Antonette Florentino, APRN   01/18/2022

## 2022-04-08 DIAGNOSIS — F41.1 GENERALIZED ANXIETY DISORDER: ICD-10-CM

## 2022-04-08 RX ORDER — HYDROXYZINE HYDROCHLORIDE 10 MG/1
TABLET, FILM COATED ORAL
Qty: 90 TABLET | Refills: 0 | Status: SHIPPED | OUTPATIENT
Start: 2022-04-08

## 2022-04-08 NOTE — TELEPHONE ENCOUNTER
Rx Refill Note  Requested Prescriptions     Pending Prescriptions Disp Refills   • hydrOXYzine (ATARAX) 10 MG tablet [Pharmacy Med Name: hydrOXYzine HCl 10 MG Oral Tablet] 90 tablet 0     Sig: TAKE 1 TABLET BY MOUTH THREE TIMES DAILY AS NEEDED FOR ITCHING      Last office visit with prescribing clinician: 1/18/2022      Next office visit with prescribing clinician: Visit date not found            Kareen Hills RN  04/08/22, 16:39 CDT

## 2022-09-09 DIAGNOSIS — Z30.019 ENCOUNTER FOR FEMALE BIRTH CONTROL: ICD-10-CM

## 2022-09-09 RX ORDER — NORGESTIMATE AND ETHINYL ESTRADIOL 0.25-0.035
KIT ORAL
Qty: 84 TABLET | Refills: 3 | Status: SHIPPED | OUTPATIENT
Start: 2022-09-09

## 2022-09-09 NOTE — TELEPHONE ENCOUNTER
Rx Refill Note  Requested Prescriptions     Pending Prescriptions Disp Refills   • norgestimate-ethinyl estradiol (ORTHO-CYCLEN) 0.25-35 MG-MCG per tablet [Pharmacy Med Name: NORG-ETHIN ESTRA 0.25-0.035 MG] 84 tablet 3     Sig: TAKE 1 TABLET BY MOUTH EVERY DAY      Last office visit with prescribing clinician: 1/18/2022      Next office visit with prescribing clinician: Visit date not found            Di Fine MA  09/09/22, 07:01 CDT

## 2022-12-03 ENCOUNTER — APPOINTMENT (OUTPATIENT)
Dept: ULTRASOUND IMAGING | Age: 22
End: 2022-12-03
Payer: MEDICAID

## 2022-12-03 ENCOUNTER — HOSPITAL ENCOUNTER (EMERGENCY)
Age: 22
Discharge: HOME | End: 2022-12-03
Attending: EMERGENCY MEDICINE
Payer: MEDICAID

## 2022-12-03 VITALS
BODY MASS INDEX: 27.32 KG/M2 | DIASTOLIC BLOOD PRESSURE: 80 MMHG | RESPIRATION RATE: 14 BRPM | OXYGEN SATURATION: 100 % | SYSTOLIC BLOOD PRESSURE: 138 MMHG | TEMPERATURE: 98.4 F | BODY MASS INDEX: 27.32 KG/M2 | HEIGHT: 66 IN | DIASTOLIC BLOOD PRESSURE: 80 MMHG | HEART RATE: 105 BPM | HEIGHT: 66 IN | OXYGEN SATURATION: 100 % | WEIGHT: 170 LBS | WEIGHT: 170 LBS | RESPIRATION RATE: 14 BRPM | SYSTOLIC BLOOD PRESSURE: 138 MMHG | HEART RATE: 105 BPM | TEMPERATURE: 98.4 F

## 2022-12-03 DIAGNOSIS — R10.30 LOWER ABDOMINAL PAIN: Primary | ICD-10-CM

## 2022-12-03 DIAGNOSIS — N93.9 VAGINAL BLEEDING: ICD-10-CM

## 2022-12-03 LAB
ANION GAP SERPL CALCULATED.3IONS-SCNC: 12 MMOL/L (ref 3–16)
BACTERIA: ABNORMAL /HPF
BASOPHILS ABSOLUTE: 0 K/UL (ref 0–0.2)
BASOPHILS RELATIVE PERCENT: 0.2 %
BILIRUBIN URINE: NEGATIVE
BLOOD, URINE: ABNORMAL
BUN BLDV-MCNC: 11 MG/DL (ref 7–20)
CALCIUM SERPL-MCNC: 9.6 MG/DL (ref 8.3–10.6)
CHLORIDE BLD-SCNC: 108 MMOL/L (ref 99–110)
CLARITY: CLEAR
CO2: 24 MMOL/L (ref 21–32)
COLOR: YELLOW
CREAT SERPL-MCNC: <0.5 MG/DL (ref 0.6–1.1)
EOSINOPHILS ABSOLUTE: 0 K/UL (ref 0–0.6)
EOSINOPHILS RELATIVE PERCENT: 0.1 %
EPITHELIAL CELLS, UA: ABNORMAL /HPF (ref 0–5)
GFR SERPL CREATININE-BSD FRML MDRD: >60 ML/MIN/{1.73_M2}
GLUCOSE BLD-MCNC: 81 MG/DL (ref 70–99)
GLUCOSE URINE: NEGATIVE MG/DL
HCG(URINE) PREGNANCY TEST: NEGATIVE
HCT VFR BLD CALC: 39.4 % (ref 36–48)
HEMOGLOBIN: 13.2 G/DL (ref 12–16)
KETONES, URINE: ABNORMAL MG/DL
LEUKOCYTE ESTERASE, URINE: NEGATIVE
LYMPHOCYTES ABSOLUTE: 3.3 K/UL (ref 1–5.1)
LYMPHOCYTES RELATIVE PERCENT: 26 %
MAGNESIUM: 1.9 MG/DL (ref 1.8–2.4)
MCH RBC QN AUTO: 30.2 PG (ref 26–34)
MCHC RBC AUTO-ENTMCNC: 33.4 G/DL (ref 31–36)
MCV RBC AUTO: 90.3 FL (ref 80–100)
MICROSCOPIC EXAMINATION: YES
MONOCYTES ABSOLUTE: 1.5 K/UL (ref 0–1.3)
MONOCYTES RELATIVE PERCENT: 11.7 %
MUCUS: ABNORMAL /LPF
NEUTROPHILS ABSOLUTE: 7.9 K/UL (ref 1.7–7.7)
NEUTROPHILS RELATIVE PERCENT: 62 %
NITRITE, URINE: NEGATIVE
PDW BLD-RTO: 13.5 % (ref 12.4–15.4)
PH UA: 5.5 (ref 5–8)
PLATELET # BLD: 261 K/UL (ref 135–450)
PMV BLD AUTO: 9.1 FL (ref 5–10.5)
POTASSIUM REFLEX MAGNESIUM: 3.5 MMOL/L (ref 3.5–5.1)
PROTEIN UA: 30 MG/DL
RBC # BLD: 4.36 M/UL (ref 4–5.2)
RBC UA: ABNORMAL /HPF (ref 0–4)
SODIUM BLD-SCNC: 144 MMOL/L (ref 136–145)
SPECIFIC GRAVITY UA: >=1.03 (ref 1–1.03)
URINE REFLEX TO CULTURE: ABNORMAL
URINE TYPE: ABNORMAL
UROBILINOGEN, URINE: 0.2 E.U./DL
WBC # BLD: 12.7 K/UL (ref 4–11)
WBC UA: ABNORMAL /HPF (ref 0–5)

## 2022-12-03 PROCEDURE — 96374 THER/PROPH/DIAG INJ IV PUSH: CPT

## 2022-12-03 PROCEDURE — 96372 THER/PROPH/DIAG INJ SC/IM: CPT

## 2022-12-03 PROCEDURE — 83735 ASSAY OF MAGNESIUM: CPT

## 2022-12-03 PROCEDURE — 36415 COLL VENOUS BLD VENIPUNCTURE: CPT

## 2022-12-03 PROCEDURE — 84703 CHORIONIC GONADOTROPIN ASSAY: CPT

## 2022-12-03 PROCEDURE — 6360000002 HC RX W HCPCS: Performed by: EMERGENCY MEDICINE

## 2022-12-03 PROCEDURE — 85025 COMPLETE CBC W/AUTO DIFF WBC: CPT

## 2022-12-03 PROCEDURE — 80048 BASIC METABOLIC PNL TOTAL CA: CPT

## 2022-12-03 PROCEDURE — 81001 URINALYSIS AUTO W/SCOPE: CPT

## 2022-12-03 PROCEDURE — 96361 HYDRATE IV INFUSION ADD-ON: CPT

## 2022-12-03 PROCEDURE — 2580000003 HC RX 258: Performed by: EMERGENCY MEDICINE

## 2022-12-03 RX ORDER — 0.9 % SODIUM CHLORIDE 0.9 %
1000 INTRAVENOUS SOLUTION INTRAVENOUS ONCE
Status: COMPLETED | OUTPATIENT
Start: 2022-12-03 | End: 2022-12-03

## 2022-12-03 RX ORDER — MORPHINE SULFATE 4 MG/ML
4 INJECTION, SOLUTION INTRAMUSCULAR; INTRAVENOUS ONCE
Status: COMPLETED | OUTPATIENT
Start: 2022-12-03 | End: 2022-12-03

## 2022-12-03 RX ORDER — KETOROLAC TROMETHAMINE 30 MG/ML
30 INJECTION, SOLUTION INTRAMUSCULAR; INTRAVENOUS ONCE
Status: COMPLETED | OUTPATIENT
Start: 2022-12-03 | End: 2022-12-03

## 2022-12-03 RX ADMIN — SODIUM CHLORIDE 1000 ML: 9 INJECTION, SOLUTION INTRAVENOUS at 11:24

## 2022-12-03 RX ADMIN — KETOROLAC TROMETHAMINE 30 MG: 30 INJECTION, SOLUTION INTRAMUSCULAR; INTRAVENOUS at 10:38

## 2022-12-03 RX ADMIN — MORPHINE SULFATE 4 MG: 4 INJECTION, SOLUTION INTRAMUSCULAR; INTRAVENOUS at 11:21

## 2022-12-03 ASSESSMENT — PAIN DESCRIPTION - DESCRIPTORS
DESCRIPTORS: SHARP
DESCRIPTORS: ACHING;DULL

## 2022-12-03 ASSESSMENT — PAIN - FUNCTIONAL ASSESSMENT
PAIN_FUNCTIONAL_ASSESSMENT: ACTIVITIES ARE NOT PREVENTED
PAIN_FUNCTIONAL_ASSESSMENT: 0-10
PAIN_FUNCTIONAL_ASSESSMENT: PREVENTS OR INTERFERES SOME ACTIVE ACTIVITIES AND ADLS

## 2022-12-03 ASSESSMENT — PAIN DESCRIPTION - ORIENTATION
ORIENTATION: RIGHT;LEFT;LOWER
ORIENTATION: RIGHT;LEFT;LOWER

## 2022-12-03 ASSESSMENT — LIFESTYLE VARIABLES: HOW OFTEN DO YOU HAVE A DRINK CONTAINING ALCOHOL: NEVER

## 2022-12-03 ASSESSMENT — PAIN SCALES - GENERAL
PAINLEVEL_OUTOF10: 6
PAINLEVEL_OUTOF10: 8
PAINLEVEL_OUTOF10: 10
PAINLEVEL_OUTOF10: 10

## 2022-12-03 ASSESSMENT — PAIN DESCRIPTION - FREQUENCY
FREQUENCY: CONTINUOUS
FREQUENCY: CONTINUOUS

## 2022-12-03 ASSESSMENT — PAIN DESCRIPTION - LOCATION
LOCATION: ABDOMEN
LOCATION: ABDOMEN

## 2022-12-03 ASSESSMENT — PAIN DESCRIPTION - PAIN TYPE
TYPE: ACUTE PAIN
TYPE: ACUTE PAIN

## 2022-12-03 ASSESSMENT — PAIN DESCRIPTION - ONSET: ONSET: ON-GOING

## 2022-12-03 NOTE — ED PROVIDER NOTES
CHIEF COMPLAINT  Abdominal Pain      HISTORY OF PRESENT ILLNESS  Garcia Delcid is a 24 y.o. female presents to the ED with abdominal pain. The patient states she is on her third day of her period, is having heavy bleeding and crampy low pain. She states that the period is heavier than normal, she has had treatment for infertility, with clomiphene which ended about a month ago. She has had many painful periods and is been told she may have endometriosis. She has had no fever, no vomiting. She is not lightheaded. She changed 1 tampon about an hour after getting to work today. She normally takes 800 of Motrin for her menstrual pain. No other complaints, modifying factors or associated symptoms. I have reviewed the following from the nursing documentation.     Past Medical History:   Diagnosis Date    Anxiety     Cholesteatoma     Depression      Past Surgical History:   Procedure Laterality Date    EXTERNAL EAR SURGERY      TONSILLECTOMY       Family History   Problem Relation Age of Onset    High Cholesterol Mother     Asthma Mother     Brain Cancer Mother 39    Ovarian Cancer Mother 35        BRACA neg, Colaris positive    Uterine Cancer Maternal Aunt 36    Diabetes Maternal Grandmother     Heart Attack Maternal Grandmother     Colon Cancer Maternal Grandfather     Diabetes Maternal Grandfather     Heart Attack Maternal Grandfather      Social History     Socioeconomic History    Marital status: Single     Spouse name: Not on file    Number of children: Not on file    Years of education: Not on file    Highest education level: Not on file   Occupational History    Not on file   Tobacco Use    Smoking status: Every Day     Packs/day: 0.50     Types: Cigarettes    Smokeless tobacco: Never   Vaping Use    Vaping Use: Never used   Substance and Sexual Activity    Alcohol use: No    Drug use: No    Sexual activity: Not on file   Other Topics Concern    Not on file   Social History Narrative    Not on file Social Determinants of Health     Financial Resource Strain: Not on file   Food Insecurity: Not on file   Transportation Needs: Not on file   Physical Activity: Not on file   Stress: Not on file   Social Connections: Not on file   Intimate Partner Violence: Not on file   Housing Stability: Not on file     No current facility-administered medications for this encounter. No current outpatient medications on file. No Known Allergies    REVIEW OF SYSTEMS  10 systems reviewed, pertinent positives per HPI otherwise noted to be negative. PHYSICAL EXAM  BP (!) 139/90   Pulse (!) 108   Temp 98.4 °F (36.9 °C) (Oral)   Resp 14   Ht 5' 6\" (1.676 m)   Wt 170 lb (77.1 kg)   SpO2 100%   BMI 27.44 kg/m²   GENERAL APPEARANCE: Awake and alert. Cooperative. Mild painful distress  HEAD: Normocephalic. Atraumatic. EYES: PERRL. EOM's grossly intact. ENT: Mucous membranes are moist.   NECK: Supple. HEART: Slightly tachycardic but regular rhythm. No murmurs  LUNGS: Respirations unlabored, lungs are clear bilaterally. ABDOMEN: Soft. Non-distended. Tender in the right suprapubic and left suprapubic regions. no guarding or rebound. Normal bowel sounds. : Mild bleeding, small amount of clot on speculum exam, no cervical motion tenderness but moderate right adnexal tenderness and mild left adnexal tenderness on bimanual exam.  EXTREMITIES: No peripheral edema. Moves all extremities equally. All extremities neurovascularly intact. SKIN: Warm and dry. No acute rashes. NEUROLOGICAL: Alert and oriented. PSYCHIATRIC: Normal mood and affect. LABS  I have reviewed all labs for this visit.    Results for orders placed or performed during the hospital encounter of 12/03/22   Urinalysis with Reflex to Culture    Specimen: Urine   Result Value Ref Range    Color, UA Yellow Straw/Yellow    Clarity, UA Clear Clear    Glucose, Ur Negative Negative mg/dL    Bilirubin Urine Negative Negative    Ketones, Urine TRACE (A) Negative mg/dL    Specific Gravity, UA >=1.030 1.005 - 1.030    Blood, Urine LARGE (A) Negative    pH, UA 5.5 5.0 - 8.0    Protein, UA 30 (A) Negative mg/dL    Urobilinogen, Urine 0.2 <2.0 E.U./dL    Nitrite, Urine Negative Negative    Leukocyte Esterase, Urine Negative Negative    Microscopic Examination YES     Urine Type NotGiven     Urine Reflex to Culture Not Indicated    Pregnancy, Urine   Result Value Ref Range    HCG(Urine) Pregnancy Test Negative Detects HCG level >20 MIU/mL   Microscopic Urinalysis   Result Value Ref Range    Mucus, UA 2+ (A) None Seen /LPF    WBC, UA 0-2 0 - 5 /HPF    RBC, UA 21-50 (A) 0 - 4 /HPF    Epithelial Cells, UA 2-5 0 - 5 /HPF    Bacteria, UA 1+ (A) None Seen /HPF   CBC with Auto Differential   Result Value Ref Range    WBC 12.7 (H) 4.0 - 11.0 K/uL    RBC 4.36 4.00 - 5.20 M/uL    Hemoglobin 13.2 12.0 - 16.0 g/dL    Hematocrit 39.4 36.0 - 48.0 %    MCV 90.3 80.0 - 100.0 fL    MCH 30.2 26.0 - 34.0 pg    MCHC 33.4 31.0 - 36.0 g/dL    RDW 13.5 12.4 - 15.4 %    Platelets 230 093 - 351 K/uL    MPV 9.1 5.0 - 10.5 fL    Neutrophils % 62.0 %    Lymphocytes % 26.0 %    Monocytes % 11.7 %    Eosinophils % 0.1 %    Basophils % 0.2 %    Neutrophils Absolute 7.9 (H) 1.7 - 7.7 K/uL    Lymphocytes Absolute 3.3 1.0 - 5.1 K/uL    Monocytes Absolute 1.5 (H) 0.0 - 1.3 K/uL    Eosinophils Absolute 0.0 0.0 - 0.6 K/uL    Basophils Absolute 0.0 0.0 - 0.2 K/uL   BMP w/ Reflex to MG   Result Value Ref Range    Sodium 144 136 - 145 mmol/L    Potassium reflex Magnesium 3.5 3.5 - 5.1 mmol/L    Chloride 108 99 - 110 mmol/L    CO2 24 21 - 32 mmol/L    Anion Gap 12 3 - 16    Glucose 81 70 - 99 mg/dL    BUN 11 7 - 20 mg/dL    Creatinine <0.5 (L) 0.6 - 1.1 mg/dL    Est, Glom Filt Rate >60 >60    Calcium 9.6 8.3 - 10.6 mg/dL           RADIOLOGY  No results found. PROCEDURES    ED COURSE/MDM  Patient seen and evaluated. Old records reviewed.   Patient was having heavy vaginal bleeding and moderate lower abdominal pain. Pregnancy test is negative, she is trying to get pregnant had been concerned that she might be pregnant although she had not taken home pregnancy test.  She was slightly tachycardic, had right adnexal pain greater than left on bimanual exam with longstanding hormone therapy with clomiphene, making ovarian torsion a possibility. I have talked to Dr. Tiera Cardenas of [facility] we will transfer the patient here as this is quickest to get an ultrasound. I have given the patient 500 of saline, talked to 3 EMS agencies and quickest transport time is over 2 hours from now, knowing this, I spoke with the family and they are comfortable transferring the patient 10 minutes down the road for the study. CLINICAL IMPRESSION  1. Lower abdominal pain    2. Vaginal bleeding        Blood pressure 138/80, pulse (!) 105, temperature 98.4 °F (36.9 °C), temperature source Oral, resp. rate 14, height 5' 6\" (1.676 m), weight 170 lb (77.1 kg), SpO2 100 %. DISPOSITION  Klever Quach was transferred in stable condition.                   Maria Eugenia Freire MD  12/03/22 [id number]

## 2023-08-13 DIAGNOSIS — Z30.019 ENCOUNTER FOR FEMALE BIRTH CONTROL: ICD-10-CM

## 2023-08-14 RX ORDER — NORGESTIMATE AND ETHINYL ESTRADIOL 0.25-0.035
KIT ORAL
Qty: 84 TABLET | Refills: 2 | OUTPATIENT
Start: 2023-08-14

## 2023-08-16 ENCOUNTER — TELEPHONE (OUTPATIENT)
Dept: INTERNAL MEDICINE | Facility: CLINIC | Age: 23
End: 2023-08-16

## 2023-08-16 ENCOUNTER — OFFICE VISIT (OUTPATIENT)
Dept: INTERNAL MEDICINE | Facility: CLINIC | Age: 23
End: 2023-08-16
Payer: COMMERCIAL

## 2023-08-16 VITALS
OXYGEN SATURATION: 98 % | BODY MASS INDEX: 42.99 KG/M2 | WEIGHT: 258 LBS | TEMPERATURE: 97.8 F | HEART RATE: 96 BPM | HEIGHT: 65 IN | DIASTOLIC BLOOD PRESSURE: 84 MMHG | SYSTOLIC BLOOD PRESSURE: 116 MMHG

## 2023-08-16 DIAGNOSIS — Z30.019 ENCOUNTER FOR FEMALE BIRTH CONTROL: Primary | ICD-10-CM

## 2023-08-16 PROBLEM — H66.90 INFECTION OF EAR: Status: ACTIVE | Noted: 2023-08-16

## 2023-08-16 PROBLEM — H91.90 HEARING LOSS: Status: ACTIVE | Noted: 2023-08-16

## 2023-08-16 PROCEDURE — 99213 OFFICE O/P EST LOW 20 MIN: CPT

## 2023-08-16 RX ORDER — NORGESTIMATE AND ETHINYL ESTRADIOL 0.25-0.035
1 KIT ORAL DAILY
Qty: 84 TABLET | Refills: 3 | Status: SHIPPED | OUTPATIENT
Start: 2023-08-16

## 2023-08-16 NOTE — TELEPHONE ENCOUNTER
Caller: Goldie Thomas    Relationship: Self    Best call back number: [phone]    What form or medical record are you requesting: WORK EXCUSE FOR VISIT ON 08.16.2023    Who is requesting this form or medical record from you: WORK    How would you like to receive the form or medical records (pick-up, mail, fax): PATIENT WOULD LIKE UPLOAD TO [facility]    Timeframe paperwork needed: AS SOON AS POSSIBLE

## 2023-08-16 NOTE — PROGRESS NOTES
"Chief Complaint  Contraception (Needs refill of meds/)  Answers submitted by the patient for this visit:  Office Visit on 8/16/2023  4:00 PM with SERA Mckeon (Submitted on 8/16/2023)  Please describe your symptoms.: Just needing to get refill for birth control.  Have you had these symptoms before?: No  How long have you been having these symptoms?: 1-4 days    Subjective        Goldie Thomas presents to [facility]  History of Present Illness  Patient is a 22-year-old female presenting today for contraception. She ran out of birth control on Sunday 8/13/2023. Has been taking birthcontrol without issue for the past year. No other concerns at this time. No longer reporting issues with anxiety and depression.     Past Medical History:   Diagnosis Date    Anxiety     Depression      Past Surgical History:   Procedure Laterality Date    INNER EAR SURGERY      TONSILLECTOMY       Social History     Socioeconomic History    Marital status: Single   Tobacco Use    Smoking status: Every Day     Packs/day: 0.50     Years: 1.00     Pack years: 0.50     Types: Cigarettes    Smokeless tobacco: Never   Vaping Use    Vaping Use: Never used   Substance and Sexual Activity    Alcohol use: Never    Drug use: Never    Sexual activity: Yes     Partners: Male     Birth control/protection: OCP     Family History   Problem Relation Age of Onset    Arthritis Mother     Cancer Mother        Objective   Vital Signs:  /84 (BP Location: Left arm, Patient Position: Sitting, Cuff Size: Adult)   Pulse 96   Temp 97.8 øF (36.6 øC) (Infrared)   Ht 165.1 cm (65\")   Wt 117 kg (258 lb)   SpO2 98%   BMI 42.93 kg/mý   Estimated body mass index is 42.93 kg/mý as calculated from the following:    Height as of this encounter: 165.1 cm (65\").    Weight as of this encounter: 117 kg (258 lb).         PHQ-9 Depression Screening  Little interest or pleasure in doing things? 0-->not at all   Feeling down, " depressed, or hopeless? 0-->not at all   Trouble falling or staying asleep, or sleeping too much?     Feeling tired or having little energy?     Poor appetite or overeating?     Feeling bad about yourself - or that you are a failure or have let yourself or your family down?     Trouble concentrating on things, such as reading the newspaper or watching television?     Moving or speaking so slowly that other people could have noticed? Or the opposite - being so fidgety or restless that you have been moving around a lot more than usual?     Thoughts that you would be better off dead, or of hurting yourself in some way?     PHQ-9 Total Score 0   If you checked off any problems, how difficult have these problems made it for you to do your work, take care of things at home, or get along with other people?        ENMANUEL-7: Over the last two weeks, how often have you been bothered by the following problems?  Feeling nervous, anxious or on edge: Not at all  Not being able to stop or control worrying: Not at all  Worrying too much about different things: Not at all  Trouble Relaxing: Not at all  Being so restless that it is hard to sit still: Not at all  Becoming easily annoyed or irritable: Not at all  Feeling afraid as if something awful might happen: Not at all  ENMANUEL 7 Total Score: 0  If you checked any problems, how difficult have these problems made it for you to do your work, take care of things at home, or get along with other people: Not difficult at all      Physical Exam  Vitals and nursing note reviewed.   Constitutional:       Appearance: She is morbidly obese.   HENT:      Head: Normocephalic.      Nose: Nose normal. No congestion.      Mouth/Throat:      Mouth: Mucous membranes are moist.   Eyes:      Conjunctiva/sclera: Conjunctivae normal.      Pupils: Pupils are equal, round, and reactive to light.   Cardiovascular:      Rate and Rhythm: Normal rate and regular rhythm.      Pulses: Normal pulses.      Heart  sounds: Normal heart sounds. No murmur heard.  Pulmonary:      Effort: Pulmonary effort is normal.      Breath sounds: Normal breath sounds.   Abdominal:      General: Bowel sounds are normal.      Palpations: Abdomen is soft.   Musculoskeletal:         General: Normal range of motion.      Cervical back: Normal range of motion and neck supple.   Skin:     General: Skin is warm and dry.      Capillary Refill: Capillary refill takes less than 2 seconds.   Neurological:      General: No focal deficit present.      Mental Status: She is alert.   Psychiatric:         Mood and Affect: Mood normal.               Assessment and Plan   Diagnoses and all orders for this visit:    1. Encounter for female birth control (Primary)  -     norgestimate-ethinyl estradiol (ORTHO-CYCLEN) 0.25-35 MG-MCG per tablet; Take 1 tablet by mouth Daily.  Dispense: 84 tablet; Refill: 3    - Reports currently having menstrual period, will use alternate means of contraception with restarting medication.          Follow Up   Return if symptoms worsen or fail to improve.  Patient was given instructions and counseling regarding her condition or for health maintenance advice. Please see specific information pulled into the AVS if appropriate.

## 2024-03-08 ENCOUNTER — OFFICE VISIT (OUTPATIENT)
Dept: INTERNAL MEDICINE | Facility: CLINIC | Age: 24
End: 2024-03-08
Payer: COMMERCIAL

## 2024-03-08 VITALS
OXYGEN SATURATION: 98 % | DIASTOLIC BLOOD PRESSURE: 80 MMHG | HEART RATE: 93 BPM | BODY MASS INDEX: 40.32 KG/M2 | RESPIRATION RATE: 18 BRPM | WEIGHT: 242 LBS | SYSTOLIC BLOOD PRESSURE: 135 MMHG | TEMPERATURE: 98.4 F | HEIGHT: 65 IN

## 2024-03-08 DIAGNOSIS — B35.9 RINGWORM: Primary | ICD-10-CM

## 2024-03-08 PROCEDURE — 99213 OFFICE O/P EST LOW 20 MIN: CPT | Performed by: NURSE PRACTITIONER

## 2024-03-08 RX ORDER — CLOTRIMAZOLE AND BETAMETHASONE DIPROPIONATE 10; .64 MG/G; MG/G
1 CREAM TOPICAL 2 TIMES DAILY
Qty: 45 G | Refills: 0 | Status: SHIPPED | OUTPATIENT
Start: 2024-03-08

## 2024-03-08 NOTE — PROGRESS NOTES
Subjective     Chief Complaint   Patient presents with    Skin Lesion     On neck, left side. Noticed one week ago.        History of Present Illness  Pt presents today for a spot on her neck. She noticed it about a week. IT started off like a little bump and it was itching, and now it has gotten bigger. It was raised initially but it is not longer raised. She has tried antifungal cream for about a week and it didn't change at all. She hasn't tried anything else. She denies pain, but she states it is irritated and burns a little. She denies any change in soaps, lotions, or other things.       Review of Systems   Constitutional:  Negative for activity change, appetite change, chills and fever.   Eyes:  Negative for visual disturbance.   Respiratory:  Negative for chest tightness, shortness of breath and wheezing.    Cardiovascular:  Negative for chest pain, palpitations and leg swelling.   Gastrointestinal:  Negative for abdominal pain, blood in stool, diarrhea, nausea and vomiting.   Genitourinary:  Negative for difficulty urinating.   Musculoskeletal:  Negative for arthralgias, joint swelling and myalgias.   Skin:  Negative for rash.        Red ring on left side of neck that itches and burns   Allergic/Immunologic: Negative for immunocompromised state.   Neurological:  Negative for dizziness, syncope, weakness, light-headedness and headaches.   Psychiatric/Behavioral:  Negative for confusion and sleep disturbance. The patient is not nervous/anxious.       Otherwise complete ROS reviewed and negative except as mentioned in the HPI.    Past Medical History:   Past Medical History:   Diagnosis Date    Anxiety     Depression      Past Surgical History:  Past Surgical History:   Procedure Laterality Date    INNER EAR SURGERY      TONSILLECTOMY       Social History:  reports that she has been smoking electronic cigarette. She has never used smokeless tobacco. She reports that she does not drink alcohol and does  "not use drugs.    Family History: family history includes Arthritis in her mother; Cancer in her mother.       Allergies:  No Known Allergies  Medications:  Prior to Admission medications    Medication Sig Start Date End Date Taking? Authorizing Provider   norgestimate-ethinyl estradiol (ORTHO-CYCLEN) 0.25-35 MG-MCG per tablet Take 1 tablet by mouth Daily. 8/16/23  Yes Marino Hills APRN       Objective     Vital Signs: /80 (BP Location: Right arm, Patient Position: Sitting, Cuff Size: Large Adult)   Pulse 93   Temp 98.4 °F (36.9 °C) (Skin)   Resp 18   Ht 165.1 cm (65\")   Wt 110 kg (242 lb)   SpO2 98%   BMI 40.27 kg/m²   Physical Exam  Vitals reviewed.   Constitutional:       Appearance: She is well-developed.   HENT:      Head: Normocephalic and atraumatic.   Eyes:      Pupils: Pupils are equal, round, and reactive to light.   Neck:      Vascular: No JVD.        Comments: ringworm  Cardiovascular:      Rate and Rhythm: Normal rate and regular rhythm.   Pulmonary:      Effort: Pulmonary effort is normal.   Abdominal:      General: Bowel sounds are normal.      Palpations: Abdomen is soft.   Musculoskeletal:         General: No deformity.      Cervical back: Normal range of motion and neck supple.   Lymphadenopathy:      Cervical: Cervical adenopathy present.      Left cervical: Posterior cervical adenopathy present.   Skin:     General: Skin is warm and dry.   Neurological:      Mental Status: She is alert and oriented to person, place, and time.   Psychiatric:         Behavior: Behavior normal.         Thought Content: Thought content normal.         Judgment: Judgment normal.       Results Reviewed:  Glucose   Date Value Ref Range Status   08/23/2021 83 65 - 99 mg/dL Final   06/10/2018 98 74 - 109 mg/dL Final     BUN   Date Value Ref Range Status   08/23/2021 8 6 - 20 mg/dL Final   06/10/2018 7 6 - 20 mg/dL Final     Creatinine   Date Value Ref Range Status   08/23/2021 0.68 0.57 - 1.00 mg/dL " Final   06/10/2018 0.7 0.5 - 0.9 mg/dL Final     Sodium   Date Value Ref Range Status   08/23/2021 140 134 - 144 mmol/L Final   06/10/2018 139 136 - 145 mmol/L Final     Potassium   Date Value Ref Range Status   08/23/2021 4.6 3.5 - 5.2 mmol/L Final   06/10/2018 3.7 3.5 - 5.0 mmol/L Final     Chloride   Date Value Ref Range Status   08/23/2021 107 (H) 96 - 106 mmol/L Final   06/10/2018 101 98 - 111 mmol/L Final     CO2   Date Value Ref Range Status   06/10/2018 24 22 - 29 mmol/L Final     Total CO2   Date Value Ref Range Status   08/23/2021 20 20 - 29 mmol/L Final     Calcium   Date Value Ref Range Status   08/23/2021 9.0 8.7 - 10.2 mg/dL Final   06/10/2018 9.5 8.6 - 10.0 mg/dL Final     ALT (SGPT)   Date Value Ref Range Status   08/23/2021 10 0 - 32 IU/L Final   06/10/2018 16 5 - 33 U/L Final     AST (SGOT)   Date Value Ref Range Status   08/23/2021 11 0 - 40 IU/L Final   06/10/2018 24 5 - 32 U/L Final     WBC   Date Value Ref Range Status   08/23/2021 9.5 3.4 - 10.8 x10E3/uL Final   06/10/2018 20.0 (H) 4.8 - 10.8 K/uL Final     Hematocrit   Date Value Ref Range Status   08/23/2021 37.8 34.0 - 46.6 % Final   06/10/2018 39.0 37.0 - 47.0 % Final     Platelets   Date Value Ref Range Status   08/23/2021 364 150 - 450 x10E3/uL Final   06/10/2018 465 (H) 130 - 400 K/uL Final     Triglycerides   Date Value Ref Range Status   08/23/2021 161 (H) 0 - 149 mg/dL Final     HDL Cholesterol   Date Value Ref Range Status   08/23/2021 44 >39 mg/dL Final     LDL Chol Calc (NIH)   Date Value Ref Range Status   08/23/2021 109 (H) 0 - 99 mg/dL Final         Assessment / Plan     Assessment/Plan:  Diagnoses and all orders for this visit:    1. Ringworm (Primary)  -     clotrimazole-betamethasone (Lotrisone) 1-0.05 % cream; Apply 1 Application topically to the appropriate area as directed 2 (Two) Times a Day.  Dispense: 45 g; Refill: 0      Return if symptoms worsen or fail to improve. unless patient needs to be seen sooner or acute  issues arise.    Code Status: Full.    I have discussed the patient results/orders and and plan/recommendation with them at today's visit.      Signed by:    SERA Mendez Date: 03/08/24  Answers submitted by the patient for this visit:  Other (Submitted on 3/7/2024)  Please describe your symptoms.: Weird spot on my neck. Thought it was maybe a ringworm. Tried anti-fungal cream for a little over a week and nothing changed and never got better. Its itchy and sometimes burns.  Have you had these symptoms before?: No  How long have you been having these symptoms?: Greater than 2 weeks  Please list any medications you are currently taking for this condition.: Generic Anti-Fungal creme.  Primary Reason for Visit (Submitted on 3/7/2024)  What is the primary reason for your visit?: Other

## 2024-07-05 ENCOUNTER — OFFICE VISIT (OUTPATIENT)
Dept: INTERNAL MEDICINE | Facility: CLINIC | Age: 24
End: 2024-07-05
Payer: COMMERCIAL

## 2024-07-05 VITALS
BODY MASS INDEX: 39.65 KG/M2 | HEIGHT: 65 IN | DIASTOLIC BLOOD PRESSURE: 78 MMHG | WEIGHT: 238 LBS | RESPIRATION RATE: 17 BRPM | HEART RATE: 76 BPM | SYSTOLIC BLOOD PRESSURE: 119 MMHG | OXYGEN SATURATION: 99 % | TEMPERATURE: 98.6 F

## 2024-07-05 DIAGNOSIS — N92.6 ABNORMAL MENSTRUATION: Primary | ICD-10-CM

## 2024-07-05 LAB
BILIRUB BLD-MCNC: NEGATIVE MG/DL
CLARITY, POC: CLEAR
COLOR UR: YELLOW
GLUCOSE UR STRIP-MCNC: NEGATIVE MG/DL
KETONES UR QL: NEGATIVE
LEUKOCYTE EST, POC: NEGATIVE
NITRITE UR-MCNC: NEGATIVE MG/ML
PH UR: 6 [PH] (ref 5–8)
PROT UR STRIP-MCNC: NEGATIVE MG/DL
RBC # UR STRIP: ABNORMAL /UL
SP GR UR: 1.03 (ref 1–1.03)
UROBILINOGEN UR QL: ABNORMAL

## 2024-07-05 PROCEDURE — 99213 OFFICE O/P EST LOW 20 MIN: CPT

## 2024-07-05 PROCEDURE — 81003 URINALYSIS AUTO W/O SCOPE: CPT

## 2024-07-05 NOTE — PROGRESS NOTES
Subjective     Chief Complaint   Patient presents with    Menstrual Problem     Inconsistent menstruation.        History of Present Illness  History of Present Illness  The patient presents for evaluation of menstrual problem.    The patient reports experiencing menstrual issues, which commenced last Wednesday, which is her usual contraceptive regimen. The bleeding ceased, but resumed on Saturday and has been persisting since. The bleeding is characterized by bright red blood, but she denies any exacerbation of cramping. Her menstrual flow is characterized by a heavy flow, a condition she has previously encountered. This is her first encounter with this condition. She has been on contraceptive pills for several years. She acknowledges an increase in stress levels, but denies any possibility of pregnancy. There have been no alterations in her dietary habits. She denies any abnormal discharge or pelvic pain. Her menstrual cycle typically lasts 4 to 5 days. She did not experience any bleeding upon awakening this morning. She denies any increase in physical activity. She denies any urinary frequency or dysuria.    Patient's PMR from outside medical facility reviewed and noted.    Review of Systems   Genitourinary:  Positive for menstrual problem.        Otherwise complete ROS reviewed and negative except as mentioned in the HPI.    Past Medical History:   Past Medical History:   Diagnosis Date    Anxiety     Depression      Past Surgical History:  Past Surgical History:   Procedure Laterality Date    INNER EAR SURGERY      TONSILLECTOMY       Social History:  reports that she has been smoking electronic cigarette. She has never used smokeless tobacco. She reports that she does not drink alcohol and does not use drugs.    Family History: family history includes Arthritis in her mother; Cancer in her mother.      Allergies:  No Known Allergies  Medications:  Prior to Admission medications    Medication Sig Start  "Date End Date Taking? Authorizing Provider   norgestimate-ethinyl estradiol (ORTHO-CYCLEN) 0.25-35 MG-MCG per tablet Take 1 tablet by mouth Daily. 8/16/23  Yes Marino Hills APRN   clotrimazole-betamethasone (Lotrisone) 1-0.05 % cream Apply 1 Application topically to the appropriate area as directed 2 (Two) Times a Day. 3/8/24 7/5/24  Antonette Florentino APRN       ENMANUEL:        PHQ-9 Depression Screening  Little interest or pleasure in doing things?     Feeling down, depressed, or hopeless?     Trouble falling or staying asleep, or sleeping too much?     Feeling tired or having little energy?     Poor appetite or overeating?     Feeling bad about yourself - or that you are a failure or have let yourself or your family down?     Trouble concentrating on things, such as reading the newspaper or watching television?     Moving or speaking so slowly that other people could have noticed? Or the opposite - being so fidgety or restless that you have been moving around a lot more than usual?     Thoughts that you would be better off dead, or of hurting yourself in some way?     PHQ-9 Total Score     If you checked off any problems, how difficult have these problems made it for you to do your work, take care of things at home, or get along with other people?             Objective     Vital Signs: /78 (BP Location: Right arm, Patient Position: Sitting, Cuff Size: Large Adult)   Pulse 76   Temp 98.6 °F (37 °C) (Skin)   Resp 17   Ht 165.1 cm (65\")   Wt 108 kg (238 lb)   SpO2 99%   BMI 39.61 kg/m²   Physical Exam  Vitals and nursing note reviewed.   Constitutional:       General: She is not in acute distress.     Appearance: Normal appearance. She is not ill-appearing.   HENT:      Head: Normocephalic and atraumatic.      Nose: Nose normal.      Mouth/Throat:      Mouth: Mucous membranes are moist.      Pharynx: No posterior oropharyngeal erythema.   Eyes:      General: No scleral icterus.     Extraocular " Movements: Extraocular movements intact.      Conjunctiva/sclera: Conjunctivae normal.      Pupils: Pupils are equal, round, and reactive to light.   Cardiovascular:      Rate and Rhythm: Normal rate and regular rhythm.      Pulses: Normal pulses.      Heart sounds: Normal heart sounds.   Pulmonary:      Effort: Pulmonary effort is normal. No respiratory distress.      Breath sounds: Normal breath sounds. No wheezing.   Abdominal:      General: Abdomen is flat. Bowel sounds are normal.      Palpations: Abdomen is soft.      Tenderness: There is no abdominal tenderness.   Musculoskeletal:         General: Normal range of motion.      Cervical back: Normal range of motion.      Right lower leg: No edema.      Left lower leg: No edema.   Skin:     General: Skin is warm and dry.      Findings: No erythema or rash.   Neurological:      General: No focal deficit present.      Mental Status: She is alert and oriented to person, place, and time. Mental status is at baseline.      Motor: No weakness.   Psychiatric:         Mood and Affect: Mood normal.         Behavior: Behavior normal.         Thought Content: Thought content normal.         Judgment: Judgment normal.                Advance Care Planning   ACP discussion was held with the patient during this visit.         Results Reviewed:  Glucose   Date Value Ref Range Status   08/23/2021 83 65 - 99 mg/dL Final   06/10/2018 98 74 - 109 mg/dL Final     BUN   Date Value Ref Range Status   08/23/2021 8 6 - 20 mg/dL Final   06/10/2018 7 6 - 20 mg/dL Final     Creatinine   Date Value Ref Range Status   08/23/2021 0.68 0.57 - 1.00 mg/dL Final   06/10/2018 0.7 0.5 - 0.9 mg/dL Final     Sodium   Date Value Ref Range Status   08/23/2021 140 134 - 144 mmol/L Final   06/10/2018 139 136 - 145 mmol/L Final     Potassium   Date Value Ref Range Status   08/23/2021 4.6 3.5 - 5.2 mmol/L Final   06/10/2018 3.7 3.5 - 5.0 mmol/L Final     Chloride   Date Value Ref Range Status   08/23/2021  107 (H) 96 - 106 mmol/L Final   06/10/2018 101 98 - 111 mmol/L Final     CO2   Date Value Ref Range Status   06/10/2018 24 22 - 29 mmol/L Final     Total CO2   Date Value Ref Range Status   08/23/2021 20 20 - 29 mmol/L Final     Calcium   Date Value Ref Range Status   08/23/2021 9.0 8.7 - 10.2 mg/dL Final   06/10/2018 9.5 8.6 - 10.0 mg/dL Final     ALT (SGPT)   Date Value Ref Range Status   08/23/2021 10 0 - 32 IU/L Final   06/10/2018 16 5 - 33 U/L Final     AST (SGOT)   Date Value Ref Range Status   08/23/2021 11 0 - 40 IU/L Final   06/10/2018 24 5 - 32 U/L Final     WBC   Date Value Ref Range Status   08/23/2021 9.5 3.4 - 10.8 x10E3/uL Final   06/10/2018 20.0 (H) 4.8 - 10.8 K/uL Final     Hematocrit   Date Value Ref Range Status   08/23/2021 37.8 34.0 - 46.6 % Final   06/10/2018 39.0 37.0 - 47.0 % Final     Platelets   Date Value Ref Range Status   08/23/2021 364 150 - 450 x10E3/uL Final   06/10/2018 465 (H) 130 - 400 K/uL Final     Triglycerides   Date Value Ref Range Status   08/23/2021 161 (H) 0 - 149 mg/dL Final     HDL Cholesterol   Date Value Ref Range Status   08/23/2021 44 >39 mg/dL Final     LDL Chol Calc (NIH)   Date Value Ref Range Status   08/23/2021 109 (H) 0 - 99 mg/dL Final         Assessment / Plan     Assessment/Plan:    1. Abnormal menstruation  - POCT urinalysis dipstick, multipro    Diagnoses and all orders for this visit:    1. Abnormal menstruation (Primary)  -     POCT urinalysis dipstick, multipro        Assessment & Plan  1. Menorrhagia.  A urine pregnancy test will be conducted.--results were negative.   -discussed since this is a one time irregularity irregularity could be stress induced. Since menstruation has slowed, advised to continue ot take birth control as prescribed and monitor period cycles. Return if symptoms continue.   No follow-ups on file. unless patient needs to be seen sooner or acute issues arise.      I have discussed the patient results/orders and and  plan/recommendation with them at today's visit.    Patient or patient representative verbalized consent for the use of Ambient Listening during the visit with  SERA Emmanuel for chart documentation. 7/17/2024  12:18 CDT  SERA Emmanuel   07/05/2024        Answers submitted by the patient for this visit:  Other (Submitted on 7/3/2024)  Please describe your symptoms.: Irregular period. Stopping and starting randomly.  Have you had these symptoms before?: No  How long have you been having these symptoms?: 5-7 days  Please list any medications you are currently taking for this condition.: Birth Control  Primary Reason for Visit (Submitted on 7/3/2024)  What is the primary reason for your visit?: Other

## 2024-07-19 ENCOUNTER — OFFICE VISIT (OUTPATIENT)
Dept: INTERNAL MEDICINE | Facility: CLINIC | Age: 24
End: 2024-07-19
Payer: COMMERCIAL

## 2024-07-19 DIAGNOSIS — N92.1 MENORRHAGIA WITH IRREGULAR CYCLE: ICD-10-CM

## 2024-07-19 DIAGNOSIS — N92.6 ABNORMAL MENSTRUATION: Primary | ICD-10-CM

## 2024-07-19 PROCEDURE — 99213 OFFICE O/P EST LOW 20 MIN: CPT | Performed by: NURSE PRACTITIONER

## 2024-07-20 DIAGNOSIS — Z30.019 ENCOUNTER FOR FEMALE BIRTH CONTROL: ICD-10-CM

## 2024-07-20 LAB
ALBUMIN SERPL-MCNC: 4 G/DL (ref 4–5)
ALP SERPL-CCNC: 91 IU/L (ref 44–121)
ALT SERPL-CCNC: 14 IU/L (ref 0–32)
AST SERPL-CCNC: 15 IU/L (ref 0–40)
BASOPHILS # BLD AUTO: 0 X10E3/UL (ref 0–0.2)
BASOPHILS NFR BLD AUTO: 0 %
BILIRUB SERPL-MCNC: 0.3 MG/DL (ref 0–1.2)
BUN SERPL-MCNC: 6 MG/DL (ref 6–20)
BUN/CREAT SERPL: 7 (ref 9–23)
CALCIUM SERPL-MCNC: 9.2 MG/DL (ref 8.7–10.2)
CHLORIDE SERPL-SCNC: 105 MMOL/L (ref 96–106)
CO2 SERPL-SCNC: 24 MMOL/L (ref 20–29)
CREAT SERPL-MCNC: 0.82 MG/DL (ref 0.57–1)
EGFRCR SERPLBLD CKD-EPI 2021: 102 ML/MIN/1.73
EOSINOPHIL # BLD AUTO: 0.4 X10E3/UL (ref 0–0.4)
EOSINOPHIL NFR BLD AUTO: 5 %
ERYTHROCYTE [DISTWIDTH] IN BLOOD BY AUTOMATED COUNT: 13.5 % (ref 11.7–15.4)
GLOBULIN SER CALC-MCNC: 3.1 G/DL (ref 1.5–4.5)
GLUCOSE SERPL-MCNC: 78 MG/DL (ref 70–99)
HCT VFR BLD AUTO: 39.1 % (ref 34–46.6)
HGB BLD-MCNC: 12.5 G/DL (ref 11.1–15.9)
IMM GRANULOCYTES # BLD AUTO: 0 X10E3/UL (ref 0–0.1)
IMM GRANULOCYTES NFR BLD AUTO: 0 %
LYMPHOCYTES # BLD AUTO: 3.5 X10E3/UL (ref 0.7–3.1)
LYMPHOCYTES NFR BLD AUTO: 42 %
MCH RBC QN AUTO: 27.8 PG (ref 26.6–33)
MCHC RBC AUTO-ENTMCNC: 32 G/DL (ref 31.5–35.7)
MCV RBC AUTO: 87 FL (ref 79–97)
MONOCYTES # BLD AUTO: 0.5 X10E3/UL (ref 0.1–0.9)
MONOCYTES NFR BLD AUTO: 6 %
NEUTROPHILS # BLD AUTO: 4 X10E3/UL (ref 1.4–7)
NEUTROPHILS NFR BLD AUTO: 47 %
PLATELET # BLD AUTO: 384 X10E3/UL (ref 150–450)
POTASSIUM SERPL-SCNC: 4.7 MMOL/L (ref 3.5–5.2)
PROT SERPL-MCNC: 7.1 G/DL (ref 6–8.5)
RBC # BLD AUTO: 4.49 X10E6/UL (ref 3.77–5.28)
SODIUM SERPL-SCNC: 142 MMOL/L (ref 134–144)
WBC # BLD AUTO: 8.3 X10E3/UL (ref 3.4–10.8)

## 2024-07-21 NOTE — PROGRESS NOTES
Answers submitted by the patient for this visit:  Other (Submitted on 7/17/2024)  Please describe your symptoms.: Period going longer than normal. Painful cramps.  Have you had these symptoms before?: No  How long have you been having these symptoms?: 1-2 weeks  Please list any medications you are currently taking for this condition.: Birth control  Primary Reason for Visit (Submitted on 7/17/2024)  What is the primary reason for your visit?: Other          Subjective     Chief Complaint   Patient presents with    Menstrual Problem       History of Present Illness    Patient comes in today with complaints of menstrual irregularities despite being on her normal birth control.  Onset has been about the past month.  She does state that her periods have been very painful and heavy.  She states that she would get better for 2 or 3 days have a lighter flow and then it would return heavy again she has had several pregnancy test that were negative.  She was actually seen on the fifth of this month by Sharda in our office and was told to monitor given the persistency of symptoms that she has presented for ongoing evaluation.  She is not sexually active denies any other vaginal drainage.  She has had some cramping.  She does tell me that she has been on the same oral contraception since her teens and she is currently 23.    Otherwise complete ROS reviewed and negative except as mentioned in the HPI.    Past Medical History:   Past Medical History:   Diagnosis Date    Anxiety     Depression      Past Surgical History:  Past Surgical History:   Procedure Laterality Date    INNER EAR SURGERY      TONSILLECTOMY       Social History:  reports that she has been smoking electronic cigarette. She has never used smokeless tobacco. She reports that she does not drink alcohol and does not use drugs.    Family History: family history includes Arthritis in her mother; Cancer in her mother.       Allergies:  No Known  "Allergies  Medications:  Prior to Admission medications    Medication Sig Start Date End Date Taking? Authorizing Provider   norgestimate-ethinyl estradiol (ORTHO-CYCLEN) 0.25-35 MG-MCG per tablet Take 1 tablet by mouth Daily. 8/16/23   Marino Hills APRN       Objective     Vital Signs: /77   Pulse 86   Temp 97.3 °F (36.3 °C)   Resp 17   Ht 165.1 cm (65\")   Wt 106 kg (233 lb 4.8 oz)   SpO2 100%   BMI 38.82 kg/m²     Physical Exam    Physical Exam  Vitals reviewed.   Constitutional:       Appearance: She is well-developed. She is obese.   HENT:      Head: Normocephalic and atraumatic.   Eyes:      Pupils: Pupils are equal, round, and reactive to light.   Neck:      Vascular: No JVD.   Cardiovascular:      Rate and Rhythm: Normal rate and regular rhythm.   Pulmonary:      Effort: Pulmonary effort is normal.      Breath sounds: Normal breath sounds.   Abdominal:      General: Bowel sounds are normal.      Palpations: Abdomen is soft.   Musculoskeletal:         General: No deformity.      Cervical back: Normal range of motion and neck supple.   Lymphadenopathy:      Cervical: No cervical adenopathy.   Skin:     General: Skin is warm and dry.   Neurological:      Mental Status: She is alert and oriented to person, place, and time.   Psychiatric:         Behavior: Behavior normal.         Thought Content: Thought content normal.         Judgment: Judgment normal.        Results Reviewed:  Glucose   Date Value Ref Range Status   07/19/2024 78 70 - 99 mg/dL Final   06/10/2018 98 74 - 109 mg/dL Final     BUN   Date Value Ref Range Status   07/19/2024 6 6 - 20 mg/dL Final   06/10/2018 7 6 - 20 mg/dL Final     Creatinine   Date Value Ref Range Status   07/19/2024 0.82 0.57 - 1.00 mg/dL Final   06/10/2018 0.7 0.5 - 0.9 mg/dL Final     Sodium   Date Value Ref Range Status   07/19/2024 142 134 - 144 mmol/L Final   06/10/2018 139 136 - 145 mmol/L Final     Potassium   Date Value Ref Range Status   07/19/2024 4.7 " 3.5 - 5.2 mmol/L Final   06/10/2018 3.7 3.5 - 5.0 mmol/L Final     Chloride   Date Value Ref Range Status   07/19/2024 105 96 - 106 mmol/L Final   06/10/2018 101 98 - 111 mmol/L Final     CO2   Date Value Ref Range Status   06/10/2018 24 22 - 29 mmol/L Final     Total CO2   Date Value Ref Range Status   07/19/2024 24 20 - 29 mmol/L Final     Calcium   Date Value Ref Range Status   07/19/2024 9.2 8.7 - 10.2 mg/dL Final   06/10/2018 9.5 8.6 - 10.0 mg/dL Final     ALT (SGPT)   Date Value Ref Range Status   07/19/2024 14 0 - 32 IU/L Final   06/10/2018 16 5 - 33 U/L Final     AST (SGOT)   Date Value Ref Range Status   07/19/2024 15 0 - 40 IU/L Final   06/10/2018 24 5 - 32 U/L Final     WBC   Date Value Ref Range Status   07/19/2024 8.3 3.4 - 10.8 x10E3/uL Final   06/10/2018 20.0 (H) 4.8 - 10.8 K/uL Final     Hematocrit   Date Value Ref Range Status   07/19/2024 39.1 34.0 - 46.6 % Final   06/10/2018 39.0 37.0 - 47.0 % Final     Platelets   Date Value Ref Range Status   07/19/2024 384 150 - 450 x10E3/uL Final   06/10/2018 465 (H) 130 - 400 K/uL Final     Triglycerides   Date Value Ref Range Status   08/23/2021 161 (H) 0 - 149 mg/dL Final     HDL Cholesterol   Date Value Ref Range Status   08/23/2021 44 >39 mg/dL Final     LDL Chol Calc (NIH)   Date Value Ref Range Status   08/23/2021 109 (H) 0 - 99 mg/dL Final       Results        Assessment / Plan     Assessment/Plan:  Diagnoses and all orders for this visit:    1. Abnormal menstruation (Primary)  -     US Non-ob Transvaginal; Future    2. Menorrhagia with irregular cycle  -     US Non-ob Transvaginal; Future    As long as imaging is ok, I will change her OCP to [facility].   Assessment & Plan          No follow-ups on file. unless patient needs to be seen sooner or acute issues arise.    Code Status: full.     I have discussed the patient results/orders and and plan/recommendation with them at today's visit.      Signed by:    Antonette Florentino, SERA Date: 07/22/24

## 2024-07-22 VITALS
TEMPERATURE: 97.3 F | HEIGHT: 65 IN | DIASTOLIC BLOOD PRESSURE: 77 MMHG | RESPIRATION RATE: 17 BRPM | HEART RATE: 86 BPM | WEIGHT: 233.3 LBS | BODY MASS INDEX: 38.87 KG/M2 | OXYGEN SATURATION: 100 % | SYSTOLIC BLOOD PRESSURE: 123 MMHG

## 2024-07-22 DIAGNOSIS — Z30.019 ENCOUNTER FOR FEMALE BIRTH CONTROL: ICD-10-CM

## 2024-07-22 RX ORDER — NORGESTIMATE AND ETHINYL ESTRADIOL 0.25-0.035
1 KIT ORAL DAILY
Qty: 84 TABLET | Refills: 3 | OUTPATIENT
Start: 2024-07-22

## 2024-07-22 RX ORDER — DROSPIRENONE AND ETHINYL ESTRADIOL 0.02-3(28)
1 KIT ORAL DAILY
Qty: 84 TABLET | Refills: 3 | Status: SHIPPED | OUTPATIENT
Start: 2024-07-22

## 2024-07-22 NOTE — TELEPHONE ENCOUNTER
Rx Refill Note  Requested Prescriptions     Pending Prescriptions Disp Refills    norgestimate-ethinyl estradiol (ORTHO-CYCLEN) 0.25-35 MG-MCG per tablet [Pharmacy Med Name: NORG-ETHIN ESTRA 0.25-0.035 MG] 84 tablet 3     Sig: TAKE 1 TABLET BY MOUTH EVERY DAY      Last office visit with prescribing clinician: 7/19/2024   Last telemedicine visit with prescribing clinician: Visit date not found   Next office visit with prescribing clinician: Visit date not found                         Would you like a call back once the refill request has been completed: [] Yes [] No    If the office needs to give you a call back, can they leave a voicemail: [] Yes [] No    Kareen Hills RN  07/22/24, 07:23 CDT

## 2024-07-22 NOTE — PROGRESS NOTES
System Downtime Recovery  The EMR experienced a system downtime.  This downtime occurred on July 22 at 0700 AM for a duration of 8 hour(s).  During this downtime paper charting was completed by the patient's PCP.  The following was documented on paper during the downtime and is now being back-entered.  The following is remaining on paper and will be scanned into Epic.

## 2024-07-22 NOTE — TELEPHONE ENCOUNTER
Rx Refill Note  Requested Prescriptions     Pending Prescriptions Disp Refills    norgestimate-ethinyl estradiol (ORTHO-CYCLEN) 0.25-35 MG-MCG per tablet 84 tablet 3     Sig: Take 1 tablet by mouth Daily.      Last office visit with prescribing clinician: 7/19/2024   Last telemedicine visit with prescribing clinician: Visit date not found   Next office visit with prescribing clinician: Visit date not found                         Would you like a call back once the refill request has been completed: [] Yes [] No    If the office needs to give you a call back, can they leave a voicemail: [] Yes [] No    Kareen Hills RN  07/22/24, 12:57 CDT

## 2024-07-22 NOTE — TELEPHONE ENCOUNTER
Caller: Goldie Thomas    Relationship: Self    Best call back number: [phone]     Requested Prescriptions:   Requested Prescriptions     Pending Prescriptions Disp Refills    norgestimate-ethinyl estradiol (ORTHO-CYCLEN) 0.25-35 MG-MCG per tablet 84 tablet 3     Sig: Take 1 tablet by mouth Daily.        Pharmacy where request should be sent: [facility]Y #[id number] - [place], [place] - [address] [phone] [facility] [phone]      Last office visit with prescribing clinician: 7/19/2024   Last telemedicine visit with prescribing clinician: Visit date not found   Next office visit with prescribing clinician: Visit date not found     Additional details provided by patient: PATIENT STATES SHE IS OUT OF MEDICATION, PHARMACY REQUESTED MEDICATION ON 7/20/24    Does the patient have less than a 3 day supply:  [x] Yes  [] No    Would you like a call back once the refill request has been completed: [] Yes [x] No    If the office needs to give you a call back, can they leave a voicemail: [] Yes [x] No    Julio Cesar Quiñones Rep   07/22/24 12:27 CDT

## 2024-10-31 ENCOUNTER — TELEPHONE (OUTPATIENT)
Dept: INTERNAL MEDICINE | Facility: CLINIC | Age: 24
End: 2024-10-31

## 2024-10-31 NOTE — TELEPHONE ENCOUNTER
Patient started new birth control medication 4 months ago and is having bleeding during sex randomly, never a lot of blood. She also stated when she wipes there is light pink blood on the toilet paper often. She is wondering if she needs to change.

## 2024-10-31 NOTE — TELEPHONE ENCOUNTER
Caller: Goldie Thomas    Relationship: Self    Best call back number:     [phone]       What is the best time to reach you: ANY    Who are you requesting to speak with (clinical staff, provider,  specific staff member): CLINICAL OR PROVIDER      What was the call regarding: PLEASE CALL PATIENT AS SHE HAS QUESTIONS ABOUT HER BIRTH CONTROL & BLEEDING

## 2025-01-03 ENCOUNTER — TELEPHONE (OUTPATIENT)
Dept: INTERNAL MEDICINE | Facility: CLINIC | Age: 25
End: 2025-01-03

## 2025-01-03 NOTE — TELEPHONE ENCOUNTER
Hub staff attempted to follow warm transfer process and was unsuccessful     Caller: Goldie Thomas    Relationship to patient: Self    Best call back number: [phone]     Patient is needing: RETURNING CALL

## 2025-01-07 ENCOUNTER — OFFICE VISIT (OUTPATIENT)
Dept: INTERNAL MEDICINE | Facility: CLINIC | Age: 25
End: 2025-01-07
Payer: COMMERCIAL

## 2025-01-07 VITALS
OXYGEN SATURATION: 99 % | SYSTOLIC BLOOD PRESSURE: 118 MMHG | HEART RATE: 84 BPM | WEIGHT: 237 LBS | HEIGHT: 65 IN | TEMPERATURE: 98.2 F | DIASTOLIC BLOOD PRESSURE: 82 MMHG | BODY MASS INDEX: 39.49 KG/M2

## 2025-01-07 DIAGNOSIS — Z12.4 SCREENING FOR MALIGNANT NEOPLASM OF CERVIX: ICD-10-CM

## 2025-01-07 DIAGNOSIS — Z00.00 ROUTINE GENERAL MEDICAL EXAMINATION AT A HEALTH CARE FACILITY: Primary | ICD-10-CM

## 2025-01-07 DIAGNOSIS — Z11.59 ENCOUNTER FOR HEPATITIS C SCREENING TEST FOR LOW RISK PATIENT: ICD-10-CM

## 2025-01-07 DIAGNOSIS — E55.9 VITAMIN D DEFICIENCY: ICD-10-CM

## 2025-01-07 PROCEDURE — 99395 PREV VISIT EST AGE 18-39: CPT | Performed by: NURSE PRACTITIONER

## 2025-01-07 NOTE — PROGRESS NOTES
"        Subjective     Chief Complaint   Patient presents with    Annual Exam       History of Present Illness    Pt comes in today for her annual exam. Denies any chest pain or shortness of breath. No bowel or bladder issues. No syncope or headaches. She is inquiring about the medical marijuana. Her periods are minimal with the new birth control. She is sexually active. Denies any vaginal discharge. She does do her self breast exams.     Otherwise complete ROS reviewed and negative except as mentioned in the HPI.    Past Medical History:   Past Medical History:   Diagnosis Date    Anxiety     Depression      Past Surgical History:  Past Surgical History:   Procedure Laterality Date    INNER EAR SURGERY      TONSILLECTOMY       Social History:  reports that she has been smoking cigarettes. She has a 0.5 pack-year smoking history. She has never used smokeless tobacco. She reports that she does not drink alcohol and does not use drugs.    Family History: family history includes Arthritis in her mother; Cancer in her mother.       Allergies:  No Known Allergies  Medications:  Prior to Admission medications    Medication Sig Start Date End Date Taking? Authorizing Provider   drospirenone-ethinyl estradiol (DALLAS) 3-0.02 MG per tablet Take 1 tablet by mouth Daily. 7/22/24  Yes Antonette Florentino APRN       Objective     Vital Signs: /82 (BP Location: Left arm, Patient Position: Sitting, Cuff Size: Adult)   Pulse 84   Temp 98.2 °F (36.8 °C) (Temporal)   Ht 165.1 cm (65\")   Wt 108 kg (237 lb)   SpO2 99%   BMI 39.44 kg/m²     Physical Exam    Physical Exam  Vitals reviewed. Exam conducted with a chaperone present.   Constitutional:       Appearance: She is well-developed. She is obese.   HENT:      Head: Normocephalic and atraumatic.   Eyes:      Pupils: Pupils are equal, round, and reactive to light.   Neck:      Vascular: No JVD.   Cardiovascular:      Rate and Rhythm: Normal rate and regular rhythm. "   Pulmonary:      Effort: Pulmonary effort is normal.   Abdominal:      General: Bowel sounds are normal.      Palpations: Abdomen is soft.      Hernia: There is no hernia in the left inguinal area or right inguinal area.   Genitourinary:     General: Normal vulva.      Exam position: Lithotomy position.      Labia:         Right: No rash.         Left: No rash.       Cervix: Normal. Cervical bleeding present.      Uterus: Normal.       Adnexa: Right adnexa normal and left adnexa normal.      Rectum: Normal.   Musculoskeletal:         General: No deformity.      Cervical back: Normal range of motion and neck supple.   Lymphadenopathy:      Cervical: No cervical adenopathy.   Skin:     General: Skin is warm and dry.   Neurological:      Mental Status: She is alert and oriented to person, place, and time.   Psychiatric:         Behavior: Behavior normal.         Thought Content: Thought content normal.         Judgment: Judgment normal.     Class 2 Severe Obesity (BMI >=35 and <=39.9). Obesity-related health conditions include the following: none. Obesity is unchanged. BMI is is above average; BMI management plan is completed. We discussed low calorie, low carb based diet program, portion control, and increasing exercise.    Results Reviewed:  Glucose   Date Value Ref Range Status   07/19/2024 78 70 - 99 mg/dL Final   06/10/2018 98 74 - 109 mg/dL Final     BUN   Date Value Ref Range Status   07/19/2024 6 6 - 20 mg/dL Final   06/10/2018 7 6 - 20 mg/dL Final     Creatinine   Date Value Ref Range Status   07/19/2024 0.82 0.57 - 1.00 mg/dL Final   06/10/2018 0.7 0.5 - 0.9 mg/dL Final     Sodium   Date Value Ref Range Status   07/19/2024 142 134 - 144 mmol/L Final   06/10/2018 139 136 - 145 mmol/L Final     Potassium   Date Value Ref Range Status   07/19/2024 4.7 3.5 - 5.2 mmol/L Final   06/10/2018 3.7 3.5 - 5.0 mmol/L Final     Chloride   Date Value Ref Range Status   07/19/2024 105 96 - 106 mmol/L Final   06/10/2018 101  98 - 111 mmol/L Final     CO2   Date Value Ref Range Status   06/10/2018 24 22 - 29 mmol/L Final     Total CO2   Date Value Ref Range Status   07/19/2024 24 20 - 29 mmol/L Final     Calcium   Date Value Ref Range Status   07/19/2024 9.2 8.7 - 10.2 mg/dL Final   06/10/2018 9.5 8.6 - 10.0 mg/dL Final     ALT (SGPT)   Date Value Ref Range Status   07/19/2024 14 0 - 32 IU/L Final   06/10/2018 16 5 - 33 U/L Final     AST (SGOT)   Date Value Ref Range Status   07/19/2024 15 0 - 40 IU/L Final   06/10/2018 24 5 - 32 U/L Final     WBC   Date Value Ref Range Status   07/19/2024 8.3 3.4 - 10.8 x10E3/uL Final   06/10/2018 20.0 (H) 4.8 - 10.8 K/uL Final     Hematocrit   Date Value Ref Range Status   07/19/2024 39.1 34.0 - 46.6 % Final   06/10/2018 39.0 37.0 - 47.0 % Final     Platelets   Date Value Ref Range Status   07/19/2024 384 150 - 450 x10E3/uL Final   06/10/2018 465 (H) 130 - 400 K/uL Final     Triglycerides   Date Value Ref Range Status   08/23/2021 161 (H) 0 - 149 mg/dL Final     HDL Cholesterol   Date Value Ref Range Status   08/23/2021 44 >39 mg/dL Final     LDL Chol Calc (NIH)   Date Value Ref Range Status   08/23/2021 109 (H) 0 - 99 mg/dL Final       Results        Assessment / Plan     Assessment/Plan:  Diagnoses and all orders for this visit:    1. Routine general medical examination at a health care facility (Primary)  -     CBC & Differential  -     Comprehensive Metabolic Panel  -     Lipid Panel  -     TSH  -     Vitamin B12  -     Hemoglobin A1c  -     T4, Free    2. Vitamin D deficiency  -     Vitamin D,25-Hydroxy    3. Encounter for hepatitis C screening test for low risk patient  -     Hepatitis C antibody    4. Screening for malignant neoplasm of cervix  -     IGP, Rfx Aptima HPV ASCU      Normal GYN exam except blood in vaginal vault. Will have lab work here today. Encouraged SBE, pt is aware how to do self breast exam and the importance of same. Discussed weight management and importance of maintaining  a healthy weight. Discussed Vitamin D intake and the importance of adequate vitamin D for both Bone Health and a healthy immune system.  Discussed Daily exercise and the importance of same, in regards to a healthy heart as well as helping to maintain her weight and improving her mental health.  BMI is elevated. Pap smear is done per ASCCP guidelines.    Assessment & Plan    No follow-ups on file. unless patient needs to be seen sooner or acute issues arise.    Code Status: Full.   Patient or patient representative verbalized consent for the use of Ambient Listening during the visit with  SERA Mendez for chart documentation. 1/7/2025  08:13 CST    I have discussed the patient results/orders and and plan/recommendation with them at today's visit.      Signed by:    SERA Mendez Date: 01/07/25

## 2025-01-08 LAB
25(OH)D3+25(OH)D2 SERPL-MCNC: 16.9 NG/ML (ref 30–100)
ALBUMIN SERPL-MCNC: 4.1 G/DL (ref 4–5)
ALP SERPL-CCNC: 102 IU/L (ref 44–121)
ALT SERPL-CCNC: 11 IU/L (ref 0–32)
AST SERPL-CCNC: 15 IU/L (ref 0–40)
BASOPHILS # BLD AUTO: 0.1 X10E3/UL (ref 0–0.2)
BASOPHILS NFR BLD AUTO: 1 %
BILIRUB SERPL-MCNC: 0.2 MG/DL (ref 0–1.2)
BUN SERPL-MCNC: 9 MG/DL (ref 6–20)
BUN/CREAT SERPL: 12 (ref 9–23)
CALCIUM SERPL-MCNC: 9.4 MG/DL (ref 8.7–10.2)
CHLORIDE SERPL-SCNC: 105 MMOL/L (ref 96–106)
CHOLEST SERPL-MCNC: 246 MG/DL (ref 100–199)
CO2 SERPL-SCNC: 22 MMOL/L (ref 20–29)
CONV .: NORMAL
CREAT SERPL-MCNC: 0.76 MG/DL (ref 0.57–1)
CYTOLOGIST CVX/VAG CYTO: NORMAL
CYTOLOGY CVX/VAG DOC CYTO: NORMAL
CYTOLOGY CVX/VAG DOC THIN PREP: NORMAL
DX ICD CODE: NORMAL
EGFRCR SERPLBLD CKD-EPI 2021: 111 ML/MIN/1.73
EOSINOPHIL # BLD AUTO: 0.2 X10E3/UL (ref 0–0.4)
EOSINOPHIL NFR BLD AUTO: 2 %
ERYTHROCYTE [DISTWIDTH] IN BLOOD BY AUTOMATED COUNT: 12.7 % (ref 11.7–15.4)
GLOBULIN SER CALC-MCNC: 3.3 G/DL (ref 1.5–4.5)
GLUCOSE SERPL-MCNC: 83 MG/DL (ref 70–99)
HBA1C MFR BLD: 5.3 % (ref 4.8–5.6)
HCT VFR BLD AUTO: 40.9 % (ref 34–46.6)
HCV IGG SERPL QL IA: NON REACTIVE
HDLC SERPL-MCNC: 56 MG/DL
HGB BLD-MCNC: 13.5 G/DL (ref 11.1–15.9)
IMM GRANULOCYTES # BLD AUTO: 0 X10E3/UL (ref 0–0.1)
IMM GRANULOCYTES NFR BLD AUTO: 0 %
LDLC SERPL CALC-MCNC: 166 MG/DL (ref 0–99)
LYMPHOCYTES # BLD AUTO: 3.4 X10E3/UL (ref 0.7–3.1)
LYMPHOCYTES NFR BLD AUTO: 35 %
Lab: NORMAL
MCH RBC QN AUTO: 28.2 PG (ref 26.6–33)
MCHC RBC AUTO-ENTMCNC: 33 G/DL (ref 31.5–35.7)
MCV RBC AUTO: 85 FL (ref 79–97)
MONOCYTES # BLD AUTO: 0.4 X10E3/UL (ref 0.1–0.9)
MONOCYTES NFR BLD AUTO: 5 %
NEUTROPHILS # BLD AUTO: 5.5 X10E3/UL (ref 1.4–7)
NEUTROPHILS NFR BLD AUTO: 57 %
OTHER STN SPEC: NORMAL
PLATELET # BLD AUTO: 421 X10E3/UL (ref 150–450)
POTASSIUM SERPL-SCNC: 4.4 MMOL/L (ref 3.5–5.2)
PROT SERPL-MCNC: 7.4 G/DL (ref 6–8.5)
RBC # BLD AUTO: 4.79 X10E6/UL (ref 3.77–5.28)
SODIUM SERPL-SCNC: 140 MMOL/L (ref 134–144)
STAT OF ADQ CVX/VAG CYTO-IMP: NORMAL
T4 FREE SERPL-MCNC: 1.17 NG/DL (ref 0.82–1.77)
TRIGL SERPL-MCNC: 135 MG/DL (ref 0–149)
TSH SERPL DL<=0.005 MIU/L-ACNC: 1.09 UIU/ML (ref 0.45–4.5)
VIT B12 SERPL-MCNC: 392 PG/ML (ref 232–1245)
VLDLC SERPL CALC-MCNC: 24 MG/DL (ref 5–40)
WBC # BLD AUTO: 9.7 X10E3/UL (ref 3.4–10.8)

## 2025-04-04 RX ORDER — DROSPIRENONE AND ETHINYL ESTRADIOL 0.02-3(28)
1 KIT ORAL DAILY
Qty: 84 TABLET | Refills: 3 | Status: SHIPPED | OUTPATIENT
Start: 2025-04-04

## 2025-04-04 NOTE — TELEPHONE ENCOUNTER
Rx Refill Note  Requested Prescriptions     Pending Prescriptions Disp Refills    drospirenone-ethinyl estradiol (DALLAS) 3-0.02 MG per tablet 84 tablet 3     Sig: Take 1 tablet by mouth Daily.      Last office visit with prescribing clinician: 1/7/2025   Last telemedicine visit with prescribing clinician: Visit date not found   Next office visit with prescribing clinician: 1/8/2026                         Would you like a call back once the refill request has been completed: [] Yes [] No    If the office needs to give you a call back, can they leave a voicemail: [] Yes [] No    Kareen Hills RN  04/04/25, 07:20 CDT